# Patient Record
Sex: MALE | Race: WHITE | NOT HISPANIC OR LATINO
[De-identification: names, ages, dates, MRNs, and addresses within clinical notes are randomized per-mention and may not be internally consistent; named-entity substitution may affect disease eponyms.]

---

## 2017-01-27 PROBLEM — Z00.00 ENCOUNTER FOR PREVENTIVE HEALTH EXAMINATION: Status: ACTIVE | Noted: 2017-01-27

## 2017-01-30 ENCOUNTER — APPOINTMENT (OUTPATIENT)
Dept: ORTHOPEDIC SURGERY | Facility: CLINIC | Age: 47
End: 2017-01-30

## 2017-01-30 VITALS — BODY MASS INDEX: 34.91 KG/M2 | HEIGHT: 74 IN | WEIGHT: 272 LBS

## 2017-02-27 ENCOUNTER — APPOINTMENT (OUTPATIENT)
Dept: ORTHOPEDIC SURGERY | Facility: CLINIC | Age: 47
End: 2017-02-27

## 2017-02-27 VITALS — HEIGHT: 74 IN | WEIGHT: 272 LBS | BODY MASS INDEX: 34.91 KG/M2

## 2017-02-27 DIAGNOSIS — Z78.9 OTHER SPECIFIED HEALTH STATUS: ICD-10-CM

## 2017-02-27 DIAGNOSIS — Z80.7 FAMILY HISTORY OF OTHER MALIGNANT NEOPLASMS OF LYMPHOID, HEMATOPOIETIC AND RELATED TISSUES: ICD-10-CM

## 2017-02-27 DIAGNOSIS — Z82.61 FAMILY HISTORY OF ARTHRITIS: ICD-10-CM

## 2017-02-27 DIAGNOSIS — Z86.79 PERSONAL HISTORY OF OTHER DISEASES OF THE CIRCULATORY SYSTEM: ICD-10-CM

## 2017-06-05 ENCOUNTER — OUTPATIENT (OUTPATIENT)
Dept: OUTPATIENT SERVICES | Facility: HOSPITAL | Age: 47
LOS: 1 days | End: 2017-06-05
Payer: COMMERCIAL

## 2017-06-05 VITALS
HEIGHT: 73 IN | TEMPERATURE: 98 F | SYSTOLIC BLOOD PRESSURE: 140 MMHG | HEART RATE: 82 BPM | WEIGHT: 283.96 LBS | RESPIRATION RATE: 16 BRPM | DIASTOLIC BLOOD PRESSURE: 90 MMHG

## 2017-06-05 DIAGNOSIS — M48.07 SPINAL STENOSIS, LUMBOSACRAL REGION: ICD-10-CM

## 2017-06-05 DIAGNOSIS — R00.2 PALPITATIONS: ICD-10-CM

## 2017-06-05 DIAGNOSIS — I80.9 PHLEBITIS AND THROMBOPHLEBITIS OF UNSPECIFIED SITE: Chronic | ICD-10-CM

## 2017-06-05 DIAGNOSIS — M48.00 SPINAL STENOSIS, SITE UNSPECIFIED: ICD-10-CM

## 2017-06-05 LAB
BLD GP AB SCN SERPL QL: NEGATIVE — SIGNIFICANT CHANGE UP
BUN SERPL-MCNC: 22 MG/DL — SIGNIFICANT CHANGE UP (ref 7–23)
CALCIUM SERPL-MCNC: 9.9 MG/DL — SIGNIFICANT CHANGE UP (ref 8.4–10.5)
CHLORIDE SERPL-SCNC: 102 MMOL/L — SIGNIFICANT CHANGE UP (ref 98–107)
CO2 SERPL-SCNC: 27 MMOL/L — SIGNIFICANT CHANGE UP (ref 22–31)
CREAT SERPL-MCNC: 0.82 MG/DL — SIGNIFICANT CHANGE UP (ref 0.5–1.3)
GLUCOSE SERPL-MCNC: 92 MG/DL — SIGNIFICANT CHANGE UP (ref 70–99)
HCT VFR BLD CALC: 45.7 % — SIGNIFICANT CHANGE UP (ref 39–50)
HGB BLD-MCNC: 14.9 G/DL — SIGNIFICANT CHANGE UP (ref 13–17)
MCHC RBC-ENTMCNC: 28.2 PG — SIGNIFICANT CHANGE UP (ref 27–34)
MCHC RBC-ENTMCNC: 32.6 % — SIGNIFICANT CHANGE UP (ref 32–36)
MCV RBC AUTO: 86.4 FL — SIGNIFICANT CHANGE UP (ref 80–100)
PLATELET # BLD AUTO: 200 K/UL — SIGNIFICANT CHANGE UP (ref 150–400)
PMV BLD: 11.8 FL — SIGNIFICANT CHANGE UP (ref 7–13)
POTASSIUM SERPL-MCNC: 4.2 MMOL/L — SIGNIFICANT CHANGE UP (ref 3.5–5.3)
POTASSIUM SERPL-SCNC: 4.2 MMOL/L — SIGNIFICANT CHANGE UP (ref 3.5–5.3)
RBC # BLD: 5.29 M/UL — SIGNIFICANT CHANGE UP (ref 4.2–5.8)
RBC # FLD: 13.7 % — SIGNIFICANT CHANGE UP (ref 10.3–14.5)
RH IG SCN BLD-IMP: POSITIVE — SIGNIFICANT CHANGE UP
SODIUM SERPL-SCNC: 143 MMOL/L — SIGNIFICANT CHANGE UP (ref 135–145)
WBC # BLD: 6.29 K/UL — SIGNIFICANT CHANGE UP (ref 3.8–10.5)
WBC # FLD AUTO: 6.29 K/UL — SIGNIFICANT CHANGE UP (ref 3.8–10.5)

## 2017-06-05 PROCEDURE — 93010 ELECTROCARDIOGRAM REPORT: CPT

## 2017-06-05 RX ORDER — SODIUM CHLORIDE 9 MG/ML
1000 INJECTION, SOLUTION INTRAVENOUS
Qty: 0 | Refills: 0 | Status: DISCONTINUED | OUTPATIENT
Start: 2017-06-13 | End: 2017-06-14

## 2017-06-05 NOTE — H&P PST ADULT - PROBLEM SELECTOR PLAN 1
Pt is scheduled for L3-L5 lumbar laminectomy and fusion with instrumentation on 6/13/17.   Pre op instructions including chlorhexidine wash given and pt able to verbalize understanding. Pt is scheduled for L3-L5 lumbar laminectomy and fusion with instrumentation on 6/13/17.   Pre op instructions including chlorhexidine wash given and pt able to verbalize understanding.  Pt met STOP BANG score for JOSE precaution. OR booking notified via fax.

## 2017-06-05 NOTE — H&P PST ADULT - NEGATIVE CARDIOVASCULAR SYMPTOMS
no dyspnea on exertion/no claudication/no orthopnea/no chest pain/no peripheral edema/no palpitations/no paroxysmal nocturnal dyspnea

## 2017-06-05 NOTE — H&P PST ADULT - FAMILY HISTORY
Father  Still living? Yes, Estimated age: Age Unknown  Family history of Parkinson disease, Age at diagnosis: Age Unknown  Family history of ischemic heart disease, Age at diagnosis: Age Unknown  Family history of diabetes mellitus, Age at diagnosis: Age Unknown     Grandparent  Still living? No  Family history of ischemic heart disease, Age at diagnosis: Age Unknown

## 2017-06-05 NOTE — H&P PST ADULT - PMH
GERD (gastroesophageal reflux disease)    Hypertension    Phlebitis  right lower extremity GERD (gastroesophageal reflux disease)    Hypertension    Phlebitis  right lower extremity  Spinal stenosis GERD (gastroesophageal reflux disease)    Palpitations  dx. 2015 on metoprolol  Phlebitis  right lower extremity  Spinal stenosis

## 2017-06-05 NOTE — H&P PST ADULT - NEGATIVE OPHTHALMOLOGIC SYMPTOMS
no diplopia/no blurred vision L/no pain L/no photophobia/no lacrimation R/no lacrimation L/no pain R

## 2017-06-05 NOTE — H&P PST ADULT - RS GEN PE MLT RESP DETAILS PC
airway patent/no wheezes/good air movement/respirations non-labored/breath sounds equal/no rhonchi/no rales/no intercostal retractions/no chest wall tenderness/clear to auscultation bilaterally/no subcutaneous emphysema

## 2017-06-05 NOTE — H&P PST ADULT - NSANTHOSAYNRD_GEN_A_CORE
No. JOSE screening performed.  STOP BANG Legend: 0-2 = LOW Risk; 3-4 = INTERMEDIATE Risk; 5-8 = HIGH Risk

## 2017-06-05 NOTE — H&P PST ADULT - LYMPHATIC
anterior cervical R/posterior cervical R/posterior cervical L/supraclavicular L/supraclavicular R/anterior cervical L

## 2017-06-05 NOTE — H&P PST ADULT - HISTORY OF PRESENT ILLNESS
48 yo with PMH hypertension presents to pre surgical testing.  Pt reports he has been experiencing back pain for the past 6 years, progressively becoming worse. Last MRI done April 2017, showed spondylitises spinal stenosis.  Pt is scheduled for L3-L5 lumbar laminectomy and fusion with instrumentation on 6/13/17. 48 yo with PMH hypertension presents to pre surgical testing.  Pt reports he has been experiencing back pain for the past 6 years, progressively becoming worse. Last MRI done April 2017, showed spondylolisthises and spinal stenosis.  Pt is scheduled for L3-L5 lumbar laminectomy and fusion with instrumentation on 6/13/17.

## 2017-06-12 NOTE — ASU PATIENT PROFILE, ADULT - PMH
GERD (gastroesophageal reflux disease)    Palpitations  dx. 2015 on metoprolol  Phlebitis  right lower extremity  Spinal stenosis

## 2017-06-13 ENCOUNTER — RESULT REVIEW (OUTPATIENT)
Age: 47
End: 2017-06-13

## 2017-06-13 ENCOUNTER — INPATIENT (INPATIENT)
Facility: HOSPITAL | Age: 47
LOS: 2 days | Discharge: HOME | End: 2017-06-16
Attending: ORTHOPAEDIC SURGERY | Admitting: ORTHOPAEDIC SURGERY
Payer: COMMERCIAL

## 2017-06-13 ENCOUNTER — APPOINTMENT (OUTPATIENT)
Dept: ORTHOPEDIC SURGERY | Facility: HOSPITAL | Age: 47
End: 2017-06-13

## 2017-06-13 ENCOUNTER — TRANSCRIPTION ENCOUNTER (OUTPATIENT)
Age: 47
End: 2017-06-13

## 2017-06-13 VITALS
RESPIRATION RATE: 16 BRPM | OXYGEN SATURATION: 96 % | HEART RATE: 62 BPM | SYSTOLIC BLOOD PRESSURE: 138 MMHG | WEIGHT: 279.99 LBS | HEIGHT: 74 IN | DIASTOLIC BLOOD PRESSURE: 82 MMHG | TEMPERATURE: 98 F

## 2017-06-13 DIAGNOSIS — I80.9 PHLEBITIS AND THROMBOPHLEBITIS OF UNSPECIFIED SITE: Chronic | ICD-10-CM

## 2017-06-13 DIAGNOSIS — M48.07 SPINAL STENOSIS, LUMBOSACRAL REGION: ICD-10-CM

## 2017-06-13 LAB
BASOPHILS # BLD AUTO: 0 K/UL — SIGNIFICANT CHANGE UP (ref 0–0.2)
BASOPHILS NFR BLD AUTO: 0 % — SIGNIFICANT CHANGE UP (ref 0–2)
BASOPHILS NFR SPEC: 0 % — SIGNIFICANT CHANGE UP (ref 0–2)
BUN SERPL-MCNC: 24 MG/DL — HIGH (ref 7–23)
CALCIUM SERPL-MCNC: 8.9 MG/DL — SIGNIFICANT CHANGE UP (ref 8.4–10.5)
CHLORIDE SERPL-SCNC: 103 MMOL/L — SIGNIFICANT CHANGE UP (ref 98–107)
CO2 SERPL-SCNC: 24 MMOL/L — SIGNIFICANT CHANGE UP (ref 22–31)
CREAT SERPL-MCNC: 1.01 MG/DL — SIGNIFICANT CHANGE UP (ref 0.5–1.3)
EOSINOPHIL # BLD AUTO: 0.02 K/UL — SIGNIFICANT CHANGE UP (ref 0–0.5)
EOSINOPHIL NFR BLD AUTO: 0.2 % — SIGNIFICANT CHANGE UP (ref 0–6)
EOSINOPHIL NFR FLD: 0.9 % — SIGNIFICANT CHANGE UP (ref 0–6)
GIANT PLATELETS BLD QL SMEAR: PRESENT — SIGNIFICANT CHANGE UP
GLUCOSE SERPL-MCNC: 136 MG/DL — HIGH (ref 70–99)
HCT VFR BLD CALC: 42.1 % — SIGNIFICANT CHANGE UP (ref 39–50)
HGB BLD-MCNC: 13.5 G/DL — SIGNIFICANT CHANGE UP (ref 13–17)
IMM GRANULOCYTES NFR BLD AUTO: 0.4 % — SIGNIFICANT CHANGE UP (ref 0–1.5)
LYMPHOCYTES # BLD AUTO: 0.96 K/UL — LOW (ref 1–3.3)
LYMPHOCYTES # BLD AUTO: 9.6 % — LOW (ref 13–44)
LYMPHOCYTES NFR SPEC AUTO: 11.3 % — LOW (ref 13–44)
MCHC RBC-ENTMCNC: 28 PG — SIGNIFICANT CHANGE UP (ref 27–34)
MCHC RBC-ENTMCNC: 32.1 % — SIGNIFICANT CHANGE UP (ref 32–36)
MCV RBC AUTO: 87.2 FL — SIGNIFICANT CHANGE UP (ref 80–100)
MONOCYTES # BLD AUTO: 0.23 K/UL — SIGNIFICANT CHANGE UP (ref 0–0.9)
MONOCYTES NFR BLD AUTO: 2.3 % — SIGNIFICANT CHANGE UP (ref 2–14)
MONOCYTES NFR BLD: 0.9 % — LOW (ref 2–9)
NEUTROPHIL AB SER-ACNC: 81.7 % — HIGH (ref 43–77)
NEUTROPHILS # BLD AUTO: 8.7 K/UL — HIGH (ref 1.8–7.4)
NEUTROPHILS NFR BLD AUTO: 87.5 % — HIGH (ref 43–77)
NEUTS BAND # BLD: 5.2 % — SIGNIFICANT CHANGE UP (ref 0–6)
OVALOCYTES BLD QL SMEAR: SLIGHT — SIGNIFICANT CHANGE UP
PLATELET # BLD AUTO: 164 K/UL — SIGNIFICANT CHANGE UP (ref 150–400)
PLATELET COUNT - ESTIMATE: NORMAL — SIGNIFICANT CHANGE UP
PMV BLD: 10.6 FL — SIGNIFICANT CHANGE UP (ref 7–13)
POTASSIUM SERPL-MCNC: 4.7 MMOL/L — SIGNIFICANT CHANGE UP (ref 3.5–5.3)
POTASSIUM SERPL-SCNC: 4.7 MMOL/L — SIGNIFICANT CHANGE UP (ref 3.5–5.3)
RBC # BLD: 4.83 M/UL — SIGNIFICANT CHANGE UP (ref 4.2–5.8)
RBC # FLD: 13.8 % — SIGNIFICANT CHANGE UP (ref 10.3–14.5)
RH IG SCN BLD-IMP: POSITIVE — SIGNIFICANT CHANGE UP
SODIUM SERPL-SCNC: 142 MMOL/L — SIGNIFICANT CHANGE UP (ref 135–145)
WBC # BLD: 9.95 K/UL — SIGNIFICANT CHANGE UP (ref 3.8–10.5)
WBC # FLD AUTO: 9.95 K/UL — SIGNIFICANT CHANGE UP (ref 3.8–10.5)

## 2017-06-13 PROCEDURE — 63047 LAM FACETEC & FORAMOT LUMBAR: CPT

## 2017-06-13 PROCEDURE — 63030 LAMOT DCMPRN NRV RT 1 LMBR: CPT | Mod: 82,59

## 2017-06-13 PROCEDURE — 22840 INSERT SPINE FIXATION DEVICE: CPT

## 2017-06-13 PROCEDURE — 63047 LAM FACETEC & FORAMOT LUMBAR: CPT | Mod: 82

## 2017-06-13 PROCEDURE — 22612 ARTHRD PST TQ 1NTRSPC LUMBAR: CPT

## 2017-06-13 PROCEDURE — 63030 LAMOT DCMPRN NRV RT 1 LMBR: CPT | Mod: 59

## 2017-06-13 PROCEDURE — 22840 INSERT SPINE FIXATION DEVICE: CPT | Mod: 82

## 2017-06-13 PROCEDURE — 22612 ARTHRD PST TQ 1NTRSPC LUMBAR: CPT | Mod: 82

## 2017-06-13 PROCEDURE — 63048 LAM FACETEC &FORAMOT EA ADDL: CPT | Mod: 82

## 2017-06-13 PROCEDURE — 88311 DECALCIFY TISSUE: CPT | Mod: 26

## 2017-06-13 PROCEDURE — 72100 X-RAY EXAM L-S SPINE 2/3 VWS: CPT | Mod: 26

## 2017-06-13 PROCEDURE — 63048 LAM FACETEC &FORAMOT EA ADDL: CPT

## 2017-06-13 PROCEDURE — 88304 TISSUE EXAM BY PATHOLOGIST: CPT | Mod: 26

## 2017-06-13 RX ORDER — SODIUM CHLORIDE 9 MG/ML
1000 INJECTION INTRAMUSCULAR; INTRAVENOUS; SUBCUTANEOUS
Qty: 0 | Refills: 0 | Status: DISCONTINUED | OUTPATIENT
Start: 2017-06-13 | End: 2017-06-15

## 2017-06-13 RX ORDER — NALOXONE HYDROCHLORIDE 4 MG/.1ML
0.1 SPRAY NASAL
Qty: 0 | Refills: 0 | Status: DISCONTINUED | OUTPATIENT
Start: 2017-06-13 | End: 2017-06-14

## 2017-06-13 RX ORDER — METOPROLOL TARTRATE 50 MG
25 TABLET ORAL
Qty: 0 | Refills: 0 | Status: DISCONTINUED | OUTPATIENT
Start: 2017-06-13 | End: 2017-06-16

## 2017-06-13 RX ORDER — ONDANSETRON 8 MG/1
4 TABLET, FILM COATED ORAL EVERY 6 HOURS
Qty: 0 | Refills: 0 | Status: DISCONTINUED | OUTPATIENT
Start: 2017-06-13 | End: 2017-06-16

## 2017-06-13 RX ORDER — HYDROMORPHONE HYDROCHLORIDE 2 MG/ML
30 INJECTION INTRAMUSCULAR; INTRAVENOUS; SUBCUTANEOUS
Qty: 0 | Refills: 0 | Status: DISCONTINUED | OUTPATIENT
Start: 2017-06-13 | End: 2017-06-14

## 2017-06-13 RX ORDER — ONDANSETRON 8 MG/1
4 TABLET, FILM COATED ORAL EVERY 6 HOURS
Qty: 0 | Refills: 0 | Status: DISCONTINUED | OUTPATIENT
Start: 2017-06-13 | End: 2017-06-14

## 2017-06-13 RX ORDER — CEFAZOLIN SODIUM 1 G
1000 VIAL (EA) INJECTION EVERY 8 HOURS
Qty: 0 | Refills: 0 | Status: COMPLETED | OUTPATIENT
Start: 2017-06-13 | End: 2017-06-13

## 2017-06-13 RX ORDER — METOCLOPRAMIDE HCL 10 MG
5 TABLET ORAL ONCE
Qty: 0 | Refills: 0 | Status: COMPLETED | OUTPATIENT
Start: 2017-06-13 | End: 2017-06-13

## 2017-06-13 RX ORDER — HYDROMORPHONE HYDROCHLORIDE 2 MG/ML
0.5 INJECTION INTRAMUSCULAR; INTRAVENOUS; SUBCUTANEOUS
Qty: 0 | Refills: 0 | Status: DISCONTINUED | OUTPATIENT
Start: 2017-06-13 | End: 2017-06-14

## 2017-06-13 RX ORDER — DOCUSATE SODIUM 100 MG
100 CAPSULE ORAL THREE TIMES A DAY
Qty: 0 | Refills: 0 | Status: DISCONTINUED | OUTPATIENT
Start: 2017-06-13 | End: 2017-06-16

## 2017-06-13 RX ORDER — FAMOTIDINE 10 MG/ML
20 INJECTION INTRAVENOUS EVERY 24 HOURS
Qty: 0 | Refills: 0 | Status: DISCONTINUED | OUTPATIENT
Start: 2017-06-13 | End: 2017-06-14

## 2017-06-13 RX ADMIN — Medication 100 MILLIGRAM(S): at 15:38

## 2017-06-13 RX ADMIN — ONDANSETRON 4 MILLIGRAM(S): 8 TABLET, FILM COATED ORAL at 16:23

## 2017-06-13 RX ADMIN — HYDROMORPHONE HYDROCHLORIDE 30 MILLILITER(S): 2 INJECTION INTRAMUSCULAR; INTRAVENOUS; SUBCUTANEOUS at 12:51

## 2017-06-13 RX ADMIN — HYDROMORPHONE HYDROCHLORIDE 30 MILLILITER(S): 2 INJECTION INTRAMUSCULAR; INTRAVENOUS; SUBCUTANEOUS at 19:26

## 2017-06-13 RX ADMIN — HYDROMORPHONE HYDROCHLORIDE 30 MILLILITER(S): 2 INJECTION INTRAMUSCULAR; INTRAVENOUS; SUBCUTANEOUS at 14:46

## 2017-06-13 RX ADMIN — Medication 5 MILLIGRAM(S): at 18:39

## 2017-06-13 RX ADMIN — SODIUM CHLORIDE 100 MILLILITER(S): 9 INJECTION INTRAMUSCULAR; INTRAVENOUS; SUBCUTANEOUS at 13:47

## 2017-06-13 RX ADMIN — Medication 25 MILLIGRAM(S): at 21:07

## 2017-06-13 RX ADMIN — Medication 100 MILLIGRAM(S): at 21:07

## 2017-06-13 NOTE — BRIEF OPERATIVE NOTE - PROCEDURE
Lumbar laminectomy with discectomy with fusion  06/13/2017  L3-L5 LAMINECTOMY  L4-L5 INSTRUMENTED FUSION  L3-L4 DISCECTOMY  Active  CEDRIC

## 2017-06-13 NOTE — PROGRESS NOTE ADULT - SUBJECTIVE AND OBJECTIVE BOX
ORTHO-POST OP CHECK     WADE AMSON 47y Male is now s/p L3-L5 Laminectomy PSIF L3-L4 discectomy POD #0. Pt was seen and evaluated at bedside. Pain well controlled, pt denies any cp/sob/n/v/ha at this time.     Vital Signs Last 24 Hrs  T(C): 37, Max: 37 (06-13 @ 13:00)  T(F): 98.6, Max: 98.6 (06-13 @ 13:00)  HR: 67 (62 - 76)  BP: 135/75 (106/63 - 138/82)  BP(mean): --  RR: 16 (11 - 19)  SpO2: 96% (95% - 100%)                        13.5   9.95  )-----------( 164      ( 13 Jun 2017 12:55 )             42.1        PE:  Gen: NAD  B/L LE   Dressing C/D/I HV in place  Motor: EHL/FHL/TA/G/S intact 5/5 B/L   Sensation: Intact to light touch bilaterally   Compartments soft compressible  Distal pulses present     A/P   WADE MASON 47y Male is now s/p L3-L5 Laminectomy PSIF L3-L4 discectomy POD #0. Pain well controlled denies any cp/sob/n/v/ha at this time.  -Pain control  -DVT ppx  -Antibiotics x24hrs  -IVF  -PO Diet  -PT/OT  -WBAT, OOB  -FU am labs  -FU HV out put

## 2017-06-13 NOTE — BRIEF OPERATIVE NOTE - PRE-OP DX
Lumbar herniated disc  06/13/2017  L3-L4  Active  Rogerio Mims  Lumbar stenosis with neurogenic claudication  06/13/2017  L3-L5  Active  Rogerio Mims  Spondylolisthesis at L4-L5 level  06/13/2017    Active  Rogerio Mims

## 2017-06-14 DIAGNOSIS — Z29.9 ENCOUNTER FOR PROPHYLACTIC MEASURES, UNSPECIFIED: ICD-10-CM

## 2017-06-14 DIAGNOSIS — M48.06 SPINAL STENOSIS, LUMBAR REGION: ICD-10-CM

## 2017-06-14 DIAGNOSIS — Z01.30 ENCOUNTER FOR EXAMINATION OF BLOOD PRESSURE WITHOUT ABNORMAL FINDINGS: ICD-10-CM

## 2017-06-14 LAB
BASOPHILS # BLD AUTO: 0.01 K/UL — SIGNIFICANT CHANGE UP (ref 0–0.2)
BASOPHILS NFR BLD AUTO: 0.1 % — SIGNIFICANT CHANGE UP (ref 0–2)
BUN SERPL-MCNC: 15 MG/DL — SIGNIFICANT CHANGE UP (ref 7–23)
CALCIUM SERPL-MCNC: 8.9 MG/DL — SIGNIFICANT CHANGE UP (ref 8.4–10.5)
CHLORIDE SERPL-SCNC: 103 MMOL/L — SIGNIFICANT CHANGE UP (ref 98–107)
CO2 SERPL-SCNC: 26 MMOL/L — SIGNIFICANT CHANGE UP (ref 22–31)
CREAT SERPL-MCNC: 0.8 MG/DL — SIGNIFICANT CHANGE UP (ref 0.5–1.3)
EOSINOPHIL # BLD AUTO: 0.01 K/UL — SIGNIFICANT CHANGE UP (ref 0–0.5)
EOSINOPHIL NFR BLD AUTO: 0.1 % — SIGNIFICANT CHANGE UP (ref 0–6)
GLUCOSE SERPL-MCNC: 98 MG/DL — SIGNIFICANT CHANGE UP (ref 70–99)
HCT VFR BLD CALC: 40.8 % — SIGNIFICANT CHANGE UP (ref 39–50)
HGB BLD-MCNC: 13 G/DL — SIGNIFICANT CHANGE UP (ref 13–17)
IMM GRANULOCYTES NFR BLD AUTO: 0.3 % — SIGNIFICANT CHANGE UP (ref 0–1.5)
LYMPHOCYTES # BLD AUTO: 1.34 K/UL — SIGNIFICANT CHANGE UP (ref 1–3.3)
LYMPHOCYTES # BLD AUTO: 15.1 % — SIGNIFICANT CHANGE UP (ref 13–44)
MCHC RBC-ENTMCNC: 27.5 PG — SIGNIFICANT CHANGE UP (ref 27–34)
MCHC RBC-ENTMCNC: 31.9 % — LOW (ref 32–36)
MCV RBC AUTO: 86.4 FL — SIGNIFICANT CHANGE UP (ref 80–100)
MONOCYTES # BLD AUTO: 0.91 K/UL — HIGH (ref 0–0.9)
MONOCYTES NFR BLD AUTO: 10.2 % — SIGNIFICANT CHANGE UP (ref 2–14)
NEUTROPHILS # BLD AUTO: 6.58 K/UL — SIGNIFICANT CHANGE UP (ref 1.8–7.4)
NEUTROPHILS NFR BLD AUTO: 74.2 % — SIGNIFICANT CHANGE UP (ref 43–77)
PLATELET # BLD AUTO: 201 K/UL — SIGNIFICANT CHANGE UP (ref 150–400)
PMV BLD: 11.2 FL — SIGNIFICANT CHANGE UP (ref 7–13)
POTASSIUM SERPL-MCNC: 4.1 MMOL/L — SIGNIFICANT CHANGE UP (ref 3.5–5.3)
POTASSIUM SERPL-SCNC: 4.1 MMOL/L — SIGNIFICANT CHANGE UP (ref 3.5–5.3)
RBC # BLD: 4.72 M/UL — SIGNIFICANT CHANGE UP (ref 4.2–5.8)
RBC # FLD: 13.6 % — SIGNIFICANT CHANGE UP (ref 10.3–14.5)
SODIUM SERPL-SCNC: 141 MMOL/L — SIGNIFICANT CHANGE UP (ref 135–145)
WBC # BLD: 8.88 K/UL — SIGNIFICANT CHANGE UP (ref 3.8–10.5)
WBC # FLD AUTO: 8.88 K/UL — SIGNIFICANT CHANGE UP (ref 3.8–10.5)

## 2017-06-14 PROCEDURE — 99223 1ST HOSP IP/OBS HIGH 75: CPT

## 2017-06-14 RX ORDER — POLYETHYLENE GLYCOL 3350 17 G/17G
17 POWDER, FOR SOLUTION ORAL DAILY
Qty: 0 | Refills: 0 | Status: DISCONTINUED | OUTPATIENT
Start: 2017-06-14 | End: 2017-06-16

## 2017-06-14 RX ORDER — FAMOTIDINE 10 MG/ML
20 INJECTION INTRAVENOUS
Qty: 0 | Refills: 0 | Status: DISCONTINUED | OUTPATIENT
Start: 2017-06-14 | End: 2017-06-16

## 2017-06-14 RX ORDER — BENZOCAINE AND MENTHOL 5; 1 G/100ML; G/100ML
1 LIQUID ORAL EVERY 8 HOURS
Qty: 0 | Refills: 0 | Status: DISCONTINUED | OUTPATIENT
Start: 2017-06-14 | End: 2017-06-16

## 2017-06-14 RX ORDER — HYDROMORPHONE HYDROCHLORIDE 2 MG/ML
1 INJECTION INTRAMUSCULAR; INTRAVENOUS; SUBCUTANEOUS EVERY 8 HOURS
Qty: 0 | Refills: 0 | Status: DISCONTINUED | OUTPATIENT
Start: 2017-06-14 | End: 2017-06-14

## 2017-06-14 RX ORDER — TIZANIDINE 4 MG/1
2 TABLET ORAL EVERY 6 HOURS
Qty: 0 | Refills: 0 | Status: DISCONTINUED | OUTPATIENT
Start: 2017-06-14 | End: 2017-06-16

## 2017-06-14 RX ORDER — HYDROMORPHONE HYDROCHLORIDE 2 MG/ML
1 INJECTION INTRAMUSCULAR; INTRAVENOUS; SUBCUTANEOUS EVERY 6 HOURS
Qty: 0 | Refills: 0 | Status: DISCONTINUED | OUTPATIENT
Start: 2017-06-14 | End: 2017-06-14

## 2017-06-14 RX ORDER — ACETAMINOPHEN 500 MG
650 TABLET ORAL EVERY 6 HOURS
Qty: 0 | Refills: 0 | Status: DISCONTINUED | OUTPATIENT
Start: 2017-06-14 | End: 2017-06-16

## 2017-06-14 RX ORDER — OXYCODONE HYDROCHLORIDE 5 MG/1
10 TABLET ORAL
Qty: 0 | Refills: 0 | Status: DISCONTINUED | OUTPATIENT
Start: 2017-06-14 | End: 2017-06-16

## 2017-06-14 RX ORDER — SENNA PLUS 8.6 MG/1
2 TABLET ORAL AT BEDTIME
Qty: 0 | Refills: 0 | Status: DISCONTINUED | OUTPATIENT
Start: 2017-06-14 | End: 2017-06-16

## 2017-06-14 RX ORDER — BENZOCAINE AND MENTHOL 5; 1 G/100ML; G/100ML
1 LIQUID ORAL THREE TIMES A DAY
Qty: 0 | Refills: 0 | Status: DISCONTINUED | OUTPATIENT
Start: 2017-06-14 | End: 2017-06-14

## 2017-06-14 RX ORDER — BENZOCAINE AND MENTHOL 5; 1 G/100ML; G/100ML
1 LIQUID ORAL
Qty: 0 | Refills: 0 | Status: DISCONTINUED | OUTPATIENT
Start: 2017-06-14 | End: 2017-06-14

## 2017-06-14 RX ADMIN — Medication 100 MILLIGRAM(S): at 13:12

## 2017-06-14 RX ADMIN — Medication 650 MILLIGRAM(S): at 13:12

## 2017-06-14 RX ADMIN — TIZANIDINE 2 MILLIGRAM(S): 4 TABLET ORAL at 19:01

## 2017-06-14 RX ADMIN — Medication 100 MILLIGRAM(S): at 06:06

## 2017-06-14 RX ADMIN — BENZOCAINE AND MENTHOL 1 LOZENGE: 5; 1 LIQUID ORAL at 08:43

## 2017-06-14 RX ADMIN — POLYETHYLENE GLYCOL 3350 17 GRAM(S): 17 POWDER, FOR SOLUTION ORAL at 13:12

## 2017-06-14 RX ADMIN — Medication 650 MILLIGRAM(S): at 18:02

## 2017-06-14 RX ADMIN — HYDROMORPHONE HYDROCHLORIDE 30 MILLILITER(S): 2 INJECTION INTRAMUSCULAR; INTRAVENOUS; SUBCUTANEOUS at 08:28

## 2017-06-14 RX ADMIN — Medication 100 MILLIGRAM(S): at 21:16

## 2017-06-14 RX ADMIN — FAMOTIDINE 20 MILLIGRAM(S): 10 INJECTION INTRAVENOUS at 19:01

## 2017-06-14 RX ADMIN — SENNA PLUS 2 TABLET(S): 8.6 TABLET ORAL at 21:16

## 2017-06-14 RX ADMIN — HYDROMORPHONE HYDROCHLORIDE 30 MILLILITER(S): 2 INJECTION INTRAMUSCULAR; INTRAVENOUS; SUBCUTANEOUS at 07:04

## 2017-06-14 RX ADMIN — Medication 25 MILLIGRAM(S): at 19:01

## 2017-06-14 RX ADMIN — Medication 25 MILLIGRAM(S): at 06:06

## 2017-06-14 NOTE — PROGRESS NOTE ADULT - SUBJECTIVE AND OBJECTIVE BOX
Pain is currently well controlled with pain medication. No current chest pain, shortness of breath, lightheadedness or dizziness. No nausea or vomiting. Will walk with PT today    Vitals: ICU Vital Signs Last 24 Hrs  T(C): 36.7, Max: 37 (06-13 @ 13:00)  T(F): 98, Max: 98.6 (06-13 @ 13:00)  HR: 63 (63 - 76)  BP: 124/66 (106/63 - 138/77)  BP(mean): --  ABP: --  ABP(mean): --  RR: 18 (11 - 19)  SpO2: 97% (93% - 100%)                          13.0   8.88  )-----------( 201      ( 14 Jun 2017 05:30 )             40.8       Exam:  Gen: NAD  Motor: 5/5 EHL/FHL/TA/Gastrocnemius  Sensory: SILT DP/SP/S/S/T nerve distributions  Vascular: 2+ Dorsalis Pedis pulse  Dressing: Clean, Dry, Intact  HV serosang    A/P: 47 year old M s/p L3-L5 lami/PSF, L3-L4 discectomy  - Pain Control  - Regular Diet  - Venodynes  - TLSO for comfort  - PT/OT, OOB  - WBAT  - HV drain  -cepacol lozenge prn

## 2017-06-14 NOTE — CONSULT NOTE ADULT - ASSESSMENT
47M h/o lumbar stenosis with neurogenic claudication, L3-4 herniated disc, and L4-5 spondylolisthesis s/p Lumbar laminectomy with discectomy and fusion, POD #1.

## 2017-06-14 NOTE — PROGRESS NOTE ADULT - SUBJECTIVE AND OBJECTIVE BOX
Anesthesia Pain Management Service- Attending Addendum    SUBJECTIVE: Pt doing well with IV PCA without problems reported.    Therapy:	  [ X] IV PCA	   [ ] Epidural           [ ] s/p Spinal Opoid              [ ] Postpartum infusion	  [ ] Patient controlled regional anesthesia (PCRA)    [ ] prn Analgesics    Allergies    No Known Allergies    Intolerances      MEDICATIONS  (STANDING):  HYDROmorphone PCA (1 mG/mL) 30milliLiter(s) PCA Continuous PCA Continuous  sodium chloride 0.9%. 1000milliLiter(s) IV Continuous <Continuous>  docusate sodium 100milliGRAM(s) Oral three times a day  metoprolol 25milliGRAM(s) Oral two times a day  famotidine    Tablet 20milliGRAM(s) Oral two times a day  polyethylene glycol 3350 17Gram(s) Oral daily  senna 2Tablet(s) Oral at bedtime  acetaminophen   Tablet. 650milliGRAM(s) Oral every 6 hours    MEDICATIONS  (PRN):  naloxone Injectable 0.1milliGRAM(s) IV Push every 3 minutes PRN For ANY of the following changes in patient status:  A. RR LESS THAN 10 breaths per minute, B. Oxygen saturation LESS THAN 90%, C. Sedation score of 6  ondansetron Injectable 4milliGRAM(s) IV Push every 6 hours PRN Nausea  HYDROmorphone PCA (1 mG/mL) Rescue Clinician Bolus 0.5milliGRAM(s) IV Push every 15 minutes PRN for Pain Scale GREATER THAN 6  benzocaine 15 mG/menthol 3.6 mG Lozenge 1Lozenge Oral every 8 hours PRN Sore Throat      OBJECTIVE:   [X] No new signs     [ ] Other:    Side Effects:  [X ] None			[ ] Other:    Assessment of Catheter Site:		[ ] Intact		[ ] Other:    ASSESSMENT/PLAN  [ X] Continue current therapy    [ ] Therapy changed to:    [ ] IV PCA       [ ] Epidural     [ ] prn Analgesics     Comments:

## 2017-06-14 NOTE — OCCUPATIONAL THERAPY INITIAL EVALUATION ADULT - MD ORDER
Occupational Therapy to evaluate and treat. Occupational Therapy to evaluate and treat. OOB to Chair. Per FLAQUITA Pratt, pt is okay to participate in OT evaluation and perform activity as tolerated.

## 2017-06-14 NOTE — OCCUPATIONAL THERAPY INITIAL EVALUATION ADULT - NS ASR BATHING EQUIP NEEDS
grab bar/Pt. to be educated on benefits and how to privately purchase during upcoming ADL session./shower chair

## 2017-06-14 NOTE — PROGRESS NOTE ADULT - SUBJECTIVE AND OBJECTIVE BOX
Day _2_ of Anesthesia Pain Management Service    Allergies    No Known Allergies    Intolerances    SUBJECTIVE: "It hurts most when I'm sitting and lying down, standing helps. The pump doesn't seem to do anything."    Pain Scale Score	At rest: _5/10_	With Activity: ___ 	[ ] Refer to charted pain scores    THERAPY:    [ ] IV PCA Morphine		[ ] 5 mg/mL	[ ] 1 mg/mL  [X] IV PCA Hydromorphone	[ ] 5 mg/mL	[X] 1 mg/mL  [ ] IV PCA Fentanyl		[ ] 50 micrograms/mL    Demand dose _0.2mg_ lockout _6_ (minutes) Continuous Rate _0_ Total: _5.7mg_  Daily      MEDICATIONS  (STANDING):  HYDROmorphone PCA (1 mG/mL) 30milliLiter(s) PCA Continuous PCA Continuous  sodium chloride 0.9%. 1000milliLiter(s) IV Continuous <Continuous>  docusate sodium 100milliGRAM(s) Oral three times a day  metoprolol 25milliGRAM(s) Oral two times a day  famotidine    Tablet 20milliGRAM(s) Oral two times a day  polyethylene glycol 3350 17Gram(s) Oral daily  senna 2Tablet(s) Oral at bedtime  acetaminophen   Tablet. 650milliGRAM(s) Oral every 6 hours    MEDICATIONS  (PRN):  naloxone Injectable 0.1milliGRAM(s) IV Push every 3 minutes PRN For ANY of the following changes in patient status:  A. RR LESS THAN 10 breaths per minute, B. Oxygen saturation LESS THAN 90%, C. Sedation score of 6  ondansetron Injectable 4milliGRAM(s) IV Push every 6 hours PRN Nausea  HYDROmorphone PCA (1 mG/mL) Rescue Clinician Bolus 0.5milliGRAM(s) IV Push every 15 minutes PRN for Pain Scale GREATER THAN 6  benzocaine 15 mG/menthol 3.6 mG Lozenge 1Lozenge Oral every 8 hours PRN Sore Throat      OBJECTIVE: A&Ox3, NAD, sitting up in chair    Sedation Score:	[X] Alert	[ ] Drowsy	[ ] Arousable	[ ] Asleep	[ ] Unresponsive    Side Effects:	[X] None	[ ] Nausea	[ ] Vomiting	[ ] Pruritus  		  [ ] Weakness		[ ] Numbness	[ ] Other:                              13.0   8.88  )-----------( 201      ( 14 Jun 2017 05:30 )             40.8       06-14    141  |  103  |  15  ----------------------------<  98  4.1   |  26  |  0.80    Ca    8.9      14 Jun 2017 05:30        ASSESSMENT/ PLAN    Therapy to  be:	[ ] Continue   [X] Discontinued   [X] Change to prn Analgesics    Documentation and Verification of current medications:  [X] Done	[ ] Not done, not eligible  [ ] Not done, reason not given    Comments:  Add Tylenol 650mg PO q6hrs x 2days  Zanaflex 2mg PO q6hrs x2days, PRN thereafter - for spasms  Oxycodone IR PRN analgesia

## 2017-06-14 NOTE — OCCUPATIONAL THERAPY INITIAL EVALUATION ADULT - PHYSICAL ASSIST/NONPHYSICAL ASSIST: SIT/SUPINE, REHAB EVAL
set-up required/1 person assist/verbal cues/using the log-rolling technique to adhere to spinal precautions

## 2017-06-14 NOTE — PROGRESS NOTE ADULT - SUBJECTIVE AND OBJECTIVE BOX
ANESTHESIA POSTOP CHECK    47y Male POSTOP DAY 1 S/P lumbar lami and fusion    Vital Signs Last 24 Hrs  T(C): 36.7, Max: 36.8 (06-13 @ 17:28)  T(F): 98.1, Max: 98.2 (06-13 @ 17:28)  HR: 66 (63 - 70)  BP: 124/60 (117/71 - 137/74)  BP(mean): --  RR: 17 (17 - 18)  SpO2: 99% (93% - 100%)  I&O's Summary  I & Os for 24h ending 14 Jun 2017 07:00  =============================================  IN: 200 ml / OUT: 4115 ml / NET: -3915 ml    I & Os for current day (as of 14 Jun 2017 14:26)  =============================================  IN: 0 ml / OUT: 200 ml / NET: -200 ml      [X ] NO APPARENT ANESTHESIA COMPLICATIONS      Comments:

## 2017-06-14 NOTE — OCCUPATIONAL THERAPY INITIAL EVALUATION ADULT - PRECAUTIONS/LIMITATIONS, REHAB EVAL
Thoracic Lumbar Sacral Brace prn for incisional pain/fall precautions/surgical precautions/spinal precautions

## 2017-06-14 NOTE — PHYSICAL THERAPY INITIAL EVALUATION ADULT - ADDITIONAL COMMENTS
Patient lives in a house with wife with HENRI and within. Patient did not use an AD prior to admission

## 2017-06-14 NOTE — CONSULT NOTE ADULT - SUBJECTIVE AND OBJECTIVE BOX
Chief complaint: lower back pain      HPI:  47M h/o chronic back pain x 6 years secondary to spondylolisthesis and spinal stenosis seen on MRI; admitted for lumbar laminectomy and fusion on 6/13, now POD #1.     Pain Symptoms if applicable:             	                         none	   mild         moderate         severe  Pain:	                            0	    1-3	     4-6	         7-10  Location:	  Modifying factors:	  Associated symptoms:	    Function: [x] Independent  [ ] Assistance  [ ] Total care  [ ] Non-ambulatory    Allergies: No Known Allergies    HOME MEDICATIONS: [x] Reviewed    MEDICATIONS  (STANDING):  HYDROmorphone PCA (1 mG/mL) 30milliLiter(s) PCA Continuous PCA Continuous  docusate sodium 100milliGRAM(s) Oral three times a day  metoprolol 25milliGRAM(s) Oral two times a day  famotidine    Tablet 20milliGRAM(s) Oral two times a day  polyethylene glycol 3350 17Gram(s) Oral daily  senna 2Tablet(s) Oral at bedtime  acetaminophen   Tablet. 650milliGRAM(s) Oral every 6 hours    MEDICATIONS  (PRN):  naloxone Injectable 0.1milliGRAM(s) IV Push every 3 minutes PRN For ANY of the following changes in patient status:  A. RR LESS THAN 10 breaths per minute, B. Oxygen saturation LESS THAN 90%, C. Sedation score of 6  ondansetron Injectable 4milliGRAM(s) IV Push every 6 hours PRN Nausea  HYDROmorphone PCA (1 mG/mL) Rescue Clinician Bolus 0.5milliGRAM(s) IV Push every 15 minutes PRN for Pain Scale GREATER THAN 6  benzocaine 15 mG/menthol 3.6 mG Lozenge 1Lozenge Oral every 8 hours PRN Sore Throat      PAST MEDICAL & SURGICAL HISTORY:  Palpitations: dx. 2015 on metoprolol  Spinal stenosis  Phlebitis: right lower extremity 1981  GERD    Hypertension    SOCIAL HISTORY:  Residence: [ ] [facility]  [ ] SNF  [x] Community  [ ] Substance abuse:   [ ] Tobacco:   [ ] Alcohol use:     FAMILY HISTORY:  Family history of diabetes mellitus (Father)  Family history of ischemic heart disease (Father, Grandparent)  Family history of Parkinson disease (Father)    REVIEW OF SYSTEMS:  CONSTITUTIONAL: No fever, weight loss, or fatigue  EYES: No eye pain, visual disturbances, or discharge  ENMT:  No difficulty hearing, tinnitus, vertigo; No sinus or throat pain  NECK: No pain or stiffness  RESPIRATORY: No cough, wheezing, chills or hemoptysis; No shortness of breath  CARDIOVASCULAR: No chest pain, palpitations, dizziness, or leg swelling  GASTROINTESTINAL: No abdominal or epigastric pain. No nausea, vomiting, or hematemesis; No diarrhea or constipation. No melena or hematochezia.  GENITOURINARY: No dysuria, frequency, hematuria, or incontinence  NEUROLOGICAL: No headaches, memory loss, loss of strength, numbness, or tremors  SKIN: No itching, burning, rashes, or lesions   LYMPH NODES: No enlarged glands  ENDOCRINE: No heat or cold intolerance; No hair loss  MUSCULOSKELETAL: No muscle or back pain  PSYCHIATRIC: No depression, anxiety, mood swings, or difficulty sleeping  HEME/LYMPH: No easy bruising, or bleeding gums  ALLERGY AND IMMUNOLOGIC: No hives or eczema      Vital Signs Last 24 Hrs:  T(C): 36.7, Max: 37  HR: 66 (63 - 76)  BP: 124/60 (106/63 - 138/77)  RR: 17  SpO2: 99% on RA    PHYSICAL EXAM:  GENERAL: NAD, well-groomed, well-developed  HEAD:  Atraumatic, Normocephalic  EYES: EOMI, PERRLA, conjunctiva and sclera clear  ENMT: Moist mucous membranes  NECK: Supple, No JVD  RESPIRATORY: Clear to auscultation bilaterally; No rales, rhonchi, wheezing, or rubs  CARDIOVASCULAR: Regular rate and rhythm; No murmurs, rubs, or gallops  GASTROINTESTINAL: Soft, Nontender, Nondistended; Bowel sounds present  GENITOURINARY: Not examined  EXTREMITIES:  2+ Peripheral Pulses, No clubbing, cyanosis, or edema  NERVOUS SYSTEM:  Alert & Oriented X3; Moving all 4 extremities; No gross sensory deficits  HEME/LYMPH: No lymphadenopathy noted  SKIN: No rashes or lesions; Incisions C/D/I    LABS:                        13.0   8.88  )-----------( 201                40.8         141  |  103  |  15  ----------------------------<  98  4.1   |  26  |  0.80    Ca    8.9               RADIOLOGY & ADDITIONAL STUDIES:    EKG:   Personally Reviewed:  [x ] YES     Imaging:   Personally Reviewed:  [ ] YES               Care Discussed with: Orthopedics Chief complaint: lower back pain      HPI:  47M h/o chronic back pain x 6 years secondary to spondylolisthesis and spinal stenosis seen on MRI; admitted for lumbar laminectomy and fusion on 6/13, now POD #1. Pt feels well; pain in lower back is controlled on dilaudid PCA; denies n/n, dizziness, numbness, tingling, leg pain, CP, dyspnea, cough; voiding after Mcdonald cath removed.              	                            Pain:	                            mild  Location:	                lower back  Modifying factors:	    rest  Associated symptoms:	    none    Function: [x] Independent  [ ] Assistance  [ ] Total care  [ ] Non-ambulatory    Allergies: No Known Allergies    HOME MEDICATIONS: [x] Reviewed    MEDICATIONS  (STANDING):  HYDROmorphone PCA (1 mG/mL) 30milliLiter(s) PCA Continuous PCA Continuous  docusate sodium 100milliGRAM(s) Oral three times a day  metoprolol 25milliGRAM(s) Oral two times a day  famotidine    Tablet 20milliGRAM(s) Oral two times a day  polyethylene glycol 3350 17Gram(s) Oral daily  senna 2Tablet(s) Oral at bedtime  acetaminophen   Tablet. 650milliGRAM(s) Oral every 6 hours    MEDICATIONS  (PRN):  naloxone Injectable 0.1milliGRAM(s) IV Push every 3 minutes PRN For ANY of the following changes in patient status:  A. RR LESS THAN 10 breaths per minute, B. Oxygen saturation LESS THAN 90%, C. Sedation score of 6  ondansetron Injectable 4milliGRAM(s) IV Push every 6 hours PRN Nausea  HYDROmorphone PCA (1 mG/mL) Rescue Clinician Bolus 0.5milliGRAM(s) IV Push every 15 minutes PRN for Pain Scale GREATER THAN 6  benzocaine 15 mG/menthol 3.6 mG Lozenge 1Lozenge Oral every 8 hours PRN Sore Throat      PAST MEDICAL & SURGICAL HISTORY:  Palpitations: dx. 2015 on metoprolol  Spinal stenosis  Phlebitis: right lower extremity 1981  GERD    Hypertension    SOCIAL HISTORY:  Residence: [ ] [facility]  [ ] SNF  [x] Community  [ ] Substance abuse:   [ ] Tobacco:   [ ] Alcohol use:     FAMILY HISTORY:  Family history of diabetes mellitus (Father)  Family history of ischemic heart disease (Father, Grandparent)  Family history of Parkinson disease (Father)    REVIEW OF SYSTEMS:  CONSTITUTIONAL: No fever, weight loss, or fatigue  EYES: No eye pain, visual disturbances, or discharge  ENMT:  No difficulty hearing, tinnitus, vertigo; No sinus or throat pain  NECK: No pain or stiffness  RESPIRATORY: No cough, wheezing, chills or hemoptysis; No shortness of breath  CARDIOVASCULAR: No chest pain, palpitations, dizziness, or leg swelling  GASTROINTESTINAL: No abdominal or epigastric pain. No nausea, vomiting, or hematemesis; No diarrhea or constipation. No melena or hematochezia.  GENITOURINARY: No dysuria, frequency, hematuria, or incontinence  NEUROLOGICAL: No headaches, memory loss, loss of strength, numbness, or tremors  SKIN: No itching, burning, rashes, or lesions   LYMPH NODES: No enlarged glands  ENDOCRINE: No heat or cold intolerance; No hair loss  MUSCULOSKELETAL: No muscle or back pain  PSYCHIATRIC: No depression, anxiety, mood swings, or difficulty sleeping  HEME/LYMPH: No easy bruising, or bleeding gums  ALLERGY AND IMMUNOLOGIC: No hives or eczema      Vital Signs Last 24 Hrs:  T(C): 36.7, Max: 37  HR: 66 (63 - 76)  BP: 124/60 (106/63 - 138/77)  RR: 17  SpO2: 99% on RA    PHYSICAL EXAM:  GENERAL: NAD, well-groomed, well-developed  HEAD:  Atraumatic, Normocephalic  EYES: EOMI, PERRLA, conjunctiva and sclera clear  ENMT: Moist mucous membranes  NECK: Supple, No JVD  RESPIRATORY: Clear to auscultation bilaterally; No rales, rhonchi, wheezing, or rubs  CARDIOVASCULAR: Regular rate and rhythm; No murmurs, rubs, or gallops  GASTROINTESTINAL: Soft, Nontender, Nondistended; Bowel sounds present  GENITOURINARY: Not examined  EXTREMITIES:  2+ Peripheral Pulses, No clubbing, cyanosis, or edema  NERVOUS SYSTEM:  Alert & Oriented X3; Moving all 4 extremities; No gross sensory deficits  HEME/LYMPH: No lymphadenopathy noted  SKIN: No rashes or lesions; Incisions C/D/I    LABS:                        13.0   8.88  )-----------( 201                40.8         141  |  103  |  15  ----------------------------<  98  4.1   |  26  |  0.80    Ca    8.9               RADIOLOGY & ADDITIONAL STUDIES:    EKG:   Personally Reviewed:  [x ] YES     Imaging:   Personally Reviewed:  [ ] YES               Care Discussed with: Orthopedics Chief complaint: lower back pain      HPI:  47M h/o chronic back pain x 6 years secondary to spondylolisthesis and spinal stenosis seen on MRI; admitted for lumbar laminectomy and fusion on 6/13, now POD #1. Pt feels well; pain in lower back is controlled on dilaudid PCA; denies n/n, dizziness, numbness, tingling, leg pain, CP, dyspnea, cough; voiding after Mcdonald cath removed.              	                            Pain:	                            mild  Location:	                lower back  Modifying factors:	    rest  Associated symptoms:	    none    Function: [x] Independent  [ ] Assistance  [ ] Total care  [ ] Non-ambulatory    Allergies: No Known Allergies    HOME MEDICATIONS: [x] Reviewed    MEDICATIONS  (STANDING):  HYDROmorphone PCA (1 mG/mL) 30milliLiter(s) PCA Continuous PCA Continuous  docusate sodium 100milliGRAM(s) Oral three times a day  metoprolol 25milliGRAM(s) Oral two times a day  famotidine    Tablet 20milliGRAM(s) Oral two times a day  polyethylene glycol 3350 17Gram(s) Oral daily  senna 2Tablet(s) Oral at bedtime  acetaminophen   Tablet. 650milliGRAM(s) Oral every 6 hours    MEDICATIONS  (PRN):  naloxone Injectable 0.1milliGRAM(s) IV Push every 3 minutes PRN For ANY of the following changes in patient status:  A. RR LESS THAN 10 breaths per minute, B. Oxygen saturation LESS THAN 90%, C. Sedation score of 6  ondansetron Injectable 4milliGRAM(s) IV Push every 6 hours PRN Nausea  HYDROmorphone PCA (1 mG/mL) Rescue Clinician Bolus 0.5milliGRAM(s) IV Push every 15 minutes PRN for Pain Scale GREATER THAN 6  benzocaine 15 mG/menthol 3.6 mG Lozenge 1Lozenge Oral every 8 hours PRN Sore Throat      PAST MEDICAL & SURGICAL HISTORY:  Palpitations: dx. 2015 on metoprolol  Spinal stenosis  Phlebitis: right lower extremity 1981  GERD    Hypertension    SOCIAL HISTORY:  Residence: [ ] [facility]  [ ] SNF  [x] Community  [x ] Substance abuse: no  [x ] Tobacco: former  [x ] Alcohol use: social    FAMILY HISTORY:  Family history of diabetes mellitus (Father)  Family history of ischemic heart disease (Father, Grandparent)  Family history of Parkinson disease (Father)    REVIEW OF SYSTEMS:  CONSTITUTIONAL: No fever, weight loss, or fatigue  EYES: No eye pain, visual disturbances, or discharge  ENMT:  No difficulty hearing, tinnitus, vertigo; No sinus or throat pain  NECK: No pain or stiffness  RESPIRATORY: No cough, wheezing, chills or hemoptysis; No shortness of breath  CARDIOVASCULAR: No chest pain, palpitations, dizziness, or leg swelling  GASTROINTESTINAL: No abdominal or epigastric pain. No nausea, vomiting, or hematemesis; No diarrhea or constipation. No melena or hematochezia.  GENITOURINARY: No dysuria, frequency, hematuria, or incontinence  NEUROLOGICAL: No headaches, memory loss, loss of strength, numbness, or tremors  SKIN: No itching, burning, rashes, or lesions   LYMPH NODES: No enlarged glands  ENDOCRINE: No heat or cold intolerance; No hair loss  MUSCULOSKELETAL: No muscle or back pain  PSYCHIATRIC: No depression, anxiety, mood swings, or difficulty sleeping  HEME/LYMPH: No easy bruising, or bleeding gums  ALLERGY AND IMMUNOLOGIC: No hives or eczema      Vital Signs Last 24 Hrs:  T(C): 36.7, Max: 37  HR: 66 (63 - 76)  BP: 124/60 (106/63 - 138/77)  RR: 17  SpO2: 99% on RA    PHYSICAL EXAM:  GENERAL: NAD, well-groomed, well-developed  HEAD:  Atraumatic, Normocephalic  EYES: EOMI, PERRLA, conjunctiva and sclera clear  ENMT: Moist mucous membranes  NECK: Supple, No JVD  RESPIRATORY: Clear to auscultation bilaterally; No rales, rhonchi, wheezing, or rubs  CARDIOVASCULAR: Regular rate and rhythm; No murmurs, rubs, or gallops  GASTROINTESTINAL: Soft, Nontender, Nondistended; Bowel sounds present  GENITOURINARY: Not examined  EXTREMITIES:  2+ Peripheral Pulses, No clubbing, cyanosis, or edema  NERVOUS SYSTEM:  Alert & Oriented X3; Moving all 4 extremities; No gross sensory deficits  HEME/LYMPH: No lymphadenopathy noted  SKIN: No rashes or lesions; Incisions C/D/I    LABS:                        13.0   8.88  )-----------( 201                40.8         141  |  103  |  15  ----------------------------<  98  4.1   |  26  |  0.80    Ca    8.9               RADIOLOGY & ADDITIONAL STUDIES:    EKG:   Personally Reviewed:  [x ] YES     Imaging:   Personally Reviewed:  [ ] YES               Care Discussed with: Orthopedics

## 2017-06-14 NOTE — OCCUPATIONAL THERAPY INITIAL EVALUATION ADULT - PHYSICAL ASSIST/NONPHYSICAL ASSIST, REHAB EVAL
set-up required/using the log-rolling technique to adhere to spinal precautions/1 person assist/verbal cues

## 2017-06-14 NOTE — PROGRESS NOTE ADULT - SUBJECTIVE AND OBJECTIVE BOX
ORTHO/SPINE PA NOTE    WADE MASON 47yMale  was seen, by me and Dr. Bingham at 8 AM today, laying in bed comfortable w/o complaints.  Resolved legs pains from pre-op.  Overall doing well.  Denies any CP, SOB, HA's.    Spine-  dressing clean/dry/intact; Hemovac sewn and draining/serosanguinous  RLE- motor 5/5 quad, TA, EHL, GS  LLE- motor 5/5 quad, TA, EHL, GS          NVI distally and symmetrical    A/P-47y Male POD#   s/p lumbar laminectomy and instrumented fusion  -Analgesia  -PT/OT/OOB ambulate  -cont IS  -cont DVT PPX  -check labs  -F/u internal med recs  -D/C planning  -D/w Dr. Otilia DROANTES, PA

## 2017-06-14 NOTE — OCCUPATIONAL THERAPY INITIAL EVALUATION ADULT - NS ASR DRESSING EQUIP NEEDS
Pt. to be educated on benefits and how to privately purchase during upcoming ADL session./sock aide/reacher/dressing stick/long handled shoe horn

## 2017-06-14 NOTE — OCCUPATIONAL THERAPY INITIAL EVALUATION ADULT - GENERAL OBSERVATIONS, REHAB EVAL
Pt. received sitting in chair next to bed. NAD. +C/D/I Dressing to Back, +Accordion Drain, +IV, +PCA pump, +pulse ox to R finger. Wife present bedside. PT present bedside to perform PT evaluation in conjunction w/ OT evaluation.

## 2017-06-14 NOTE — OCCUPATIONAL THERAPY INITIAL EVALUATION ADULT - DIAGNOSIS, OT EVAL
s/p L3-L5 lumbar laminectomy and fusion with instrumentation; Decreased functional mobility; Decreased ADL management

## 2017-06-14 NOTE — OCCUPATIONAL THERAPY INITIAL EVALUATION ADULT - LIVES WITH, PROFILE
Pt. reports he lives with his wife in a house with 2 steps to enter. Once inside, pt. reports he has steps to negotiate to reach 2nd floor where main bedroom and bathroom are located. Per pt., he has a bathtub in his bathroom.

## 2017-06-15 LAB
BASOPHILS # BLD AUTO: 0.01 K/UL — SIGNIFICANT CHANGE UP (ref 0–0.2)
BASOPHILS NFR BLD AUTO: 0.1 % — SIGNIFICANT CHANGE UP (ref 0–2)
BUN SERPL-MCNC: 14 MG/DL — SIGNIFICANT CHANGE UP (ref 7–23)
CALCIUM SERPL-MCNC: 9 MG/DL — SIGNIFICANT CHANGE UP (ref 8.4–10.5)
CHLORIDE SERPL-SCNC: 104 MMOL/L — SIGNIFICANT CHANGE UP (ref 98–107)
CO2 SERPL-SCNC: 27 MMOL/L — SIGNIFICANT CHANGE UP (ref 22–31)
CREAT SERPL-MCNC: 0.87 MG/DL — SIGNIFICANT CHANGE UP (ref 0.5–1.3)
EOSINOPHIL # BLD AUTO: 0.12 K/UL — SIGNIFICANT CHANGE UP (ref 0–0.5)
EOSINOPHIL NFR BLD AUTO: 1.7 % — SIGNIFICANT CHANGE UP (ref 0–6)
GLUCOSE SERPL-MCNC: 101 MG/DL — HIGH (ref 70–99)
HCT VFR BLD CALC: 37.8 % — LOW (ref 39–50)
HGB BLD-MCNC: 12.2 G/DL — LOW (ref 13–17)
IMM GRANULOCYTES NFR BLD AUTO: 0.3 % — SIGNIFICANT CHANGE UP (ref 0–1.5)
LYMPHOCYTES # BLD AUTO: 1.71 K/UL — SIGNIFICANT CHANGE UP (ref 1–3.3)
LYMPHOCYTES # BLD AUTO: 23.8 % — SIGNIFICANT CHANGE UP (ref 13–44)
MCHC RBC-ENTMCNC: 28 PG — SIGNIFICANT CHANGE UP (ref 27–34)
MCHC RBC-ENTMCNC: 32.3 % — SIGNIFICANT CHANGE UP (ref 32–36)
MCV RBC AUTO: 86.9 FL — SIGNIFICANT CHANGE UP (ref 80–100)
MONOCYTES # BLD AUTO: 0.67 K/UL — SIGNIFICANT CHANGE UP (ref 0–0.9)
MONOCYTES NFR BLD AUTO: 9.3 % — SIGNIFICANT CHANGE UP (ref 2–14)
NEUTROPHILS # BLD AUTO: 4.67 K/UL — SIGNIFICANT CHANGE UP (ref 1.8–7.4)
NEUTROPHILS NFR BLD AUTO: 64.8 % — SIGNIFICANT CHANGE UP (ref 43–77)
PLATELET # BLD AUTO: 170 K/UL — SIGNIFICANT CHANGE UP (ref 150–400)
PMV BLD: 11.4 FL — SIGNIFICANT CHANGE UP (ref 7–13)
POTASSIUM SERPL-MCNC: 4.6 MMOL/L — SIGNIFICANT CHANGE UP (ref 3.5–5.3)
POTASSIUM SERPL-SCNC: 4.6 MMOL/L — SIGNIFICANT CHANGE UP (ref 3.5–5.3)
RBC # BLD: 4.35 M/UL — SIGNIFICANT CHANGE UP (ref 4.2–5.8)
RBC # FLD: 13.7 % — SIGNIFICANT CHANGE UP (ref 10.3–14.5)
SODIUM SERPL-SCNC: 142 MMOL/L — SIGNIFICANT CHANGE UP (ref 135–145)
WBC # BLD: 7.2 K/UL — SIGNIFICANT CHANGE UP (ref 3.8–10.5)
WBC # FLD AUTO: 7.2 K/UL — SIGNIFICANT CHANGE UP (ref 3.8–10.5)

## 2017-06-15 RX ORDER — OXYCODONE HYDROCHLORIDE 5 MG/1
5 TABLET ORAL EVERY 8 HOURS
Qty: 0 | Refills: 0 | Status: DISCONTINUED | OUTPATIENT
Start: 2017-06-15 | End: 2017-06-16

## 2017-06-15 RX ADMIN — SODIUM CHLORIDE 100 MILLILITER(S): 9 INJECTION INTRAMUSCULAR; INTRAVENOUS; SUBCUTANEOUS at 01:49

## 2017-06-15 RX ADMIN — BENZOCAINE AND MENTHOL 1 LOZENGE: 5; 1 LIQUID ORAL at 01:38

## 2017-06-15 RX ADMIN — TIZANIDINE 2 MILLIGRAM(S): 4 TABLET ORAL at 00:08

## 2017-06-15 RX ADMIN — Medication 100 MILLIGRAM(S): at 05:50

## 2017-06-15 RX ADMIN — Medication 100 MILLIGRAM(S): at 21:57

## 2017-06-15 RX ADMIN — POLYETHYLENE GLYCOL 3350 17 GRAM(S): 17 POWDER, FOR SOLUTION ORAL at 12:27

## 2017-06-15 RX ADMIN — Medication 650 MILLIGRAM(S): at 05:50

## 2017-06-15 RX ADMIN — Medication 650 MILLIGRAM(S): at 00:08

## 2017-06-15 RX ADMIN — Medication 25 MILLIGRAM(S): at 19:15

## 2017-06-15 RX ADMIN — OXYCODONE HYDROCHLORIDE 5 MILLIGRAM(S): 5 TABLET ORAL at 19:15

## 2017-06-15 RX ADMIN — TIZANIDINE 2 MILLIGRAM(S): 4 TABLET ORAL at 05:50

## 2017-06-15 RX ADMIN — OXYCODONE HYDROCHLORIDE 5 MILLIGRAM(S): 5 TABLET ORAL at 20:15

## 2017-06-15 RX ADMIN — Medication 100 MILLIGRAM(S): at 14:54

## 2017-06-15 RX ADMIN — FAMOTIDINE 20 MILLIGRAM(S): 10 INJECTION INTRAVENOUS at 05:50

## 2017-06-15 RX ADMIN — Medication 25 MILLIGRAM(S): at 05:51

## 2017-06-15 RX ADMIN — SENNA PLUS 2 TABLET(S): 8.6 TABLET ORAL at 21:57

## 2017-06-15 RX ADMIN — FAMOTIDINE 20 MILLIGRAM(S): 10 INJECTION INTRAVENOUS at 19:15

## 2017-06-15 RX ADMIN — Medication 650 MILLIGRAM(S): at 19:15

## 2017-06-15 RX ADMIN — Medication 650 MILLIGRAM(S): at 12:28

## 2017-06-15 NOTE — PROGRESS NOTE ADULT - SUBJECTIVE AND OBJECTIVE BOX
Patient got up and walked with PT. Was sitting in the chair this morning during rounds. Has not had good sleep while in the hospital. Pain is much improved. No current chest pain, shortness of breath, lightheadedness or dizziness. No nausea or vomiting. No headaches.    Vitals: ICU Vital Signs Last 24 Hrs  T(C): 37.6, Max: 37.6 (06-15 @ 05:43)  T(F): 99.7, Max: 99.7 (06-15 @ 05:43)  HR: 70 (61 - 70)  BP: 136/65 (96/52 - 136/65)  BP(mean): --  ABP: --  ABP(mean): --  RR: 17 (17 - 18)  SpO2: 97% (95% - 99%)                          13.0   8.88  )-----------( 201      ( 14 Jun 2017 05:30 )             40.8     06-14    141  |  103  |  15  ----------------------------<  98  4.1   |  26  |  0.80    Ca    8.9      14 Jun 2017 05:30        Exam:  Gen: NAD  Motor: 5/5 EHL/FHL/TA/Gastrocnemius  Sensory: SILT DP/SP/S/S/T nerve distributions  Vascular: 2+ Dorsalis Pedis pulse  Dressing: Clean, Dry, Intact  HV serosang 95/235    A/P: 47 year old M s/p L3-5 lami/fusion, L3-L4 discectomy  - Pain Control  - Regular Diet  - Venodynes  - TLSO for comfort  - PT/OT, OOB  - WBAT  - cont HV drain

## 2017-06-16 ENCOUNTER — TRANSCRIPTION ENCOUNTER (OUTPATIENT)
Age: 47
End: 2017-06-16

## 2017-06-16 VITALS
DIASTOLIC BLOOD PRESSURE: 69 MMHG | HEART RATE: 72 BPM | OXYGEN SATURATION: 95 % | SYSTOLIC BLOOD PRESSURE: 123 MMHG | RESPIRATION RATE: 18 BRPM | TEMPERATURE: 98 F

## 2017-06-16 LAB
BASOPHILS # BLD AUTO: 0.02 K/UL — SIGNIFICANT CHANGE UP (ref 0–0.2)
BASOPHILS NFR BLD AUTO: 0.2 % — SIGNIFICANT CHANGE UP (ref 0–2)
BUN SERPL-MCNC: 14 MG/DL — SIGNIFICANT CHANGE UP (ref 7–23)
CALCIUM SERPL-MCNC: 9.6 MG/DL — SIGNIFICANT CHANGE UP (ref 8.4–10.5)
CHLORIDE SERPL-SCNC: 100 MMOL/L — SIGNIFICANT CHANGE UP (ref 98–107)
CO2 SERPL-SCNC: 25 MMOL/L — SIGNIFICANT CHANGE UP (ref 22–31)
CREAT SERPL-MCNC: 0.82 MG/DL — SIGNIFICANT CHANGE UP (ref 0.5–1.3)
EOSINOPHIL # BLD AUTO: 0.15 K/UL — SIGNIFICANT CHANGE UP (ref 0–0.5)
EOSINOPHIL NFR BLD AUTO: 1.9 % — SIGNIFICANT CHANGE UP (ref 0–6)
GLUCOSE SERPL-MCNC: 88 MG/DL — SIGNIFICANT CHANGE UP (ref 70–99)
HCT VFR BLD CALC: 41.2 % — SIGNIFICANT CHANGE UP (ref 39–50)
HGB BLD-MCNC: 13.5 G/DL — SIGNIFICANT CHANGE UP (ref 13–17)
IMM GRANULOCYTES NFR BLD AUTO: 0.2 % — SIGNIFICANT CHANGE UP (ref 0–1.5)
LYMPHOCYTES # BLD AUTO: 2.27 K/UL — SIGNIFICANT CHANGE UP (ref 1–3.3)
LYMPHOCYTES # BLD AUTO: 28.3 % — SIGNIFICANT CHANGE UP (ref 13–44)
MCHC RBC-ENTMCNC: 28.7 PG — SIGNIFICANT CHANGE UP (ref 27–34)
MCHC RBC-ENTMCNC: 32.8 % — SIGNIFICANT CHANGE UP (ref 32–36)
MCV RBC AUTO: 87.5 FL — SIGNIFICANT CHANGE UP (ref 80–100)
MONOCYTES # BLD AUTO: 0.65 K/UL — SIGNIFICANT CHANGE UP (ref 0–0.9)
MONOCYTES NFR BLD AUTO: 8.1 % — SIGNIFICANT CHANGE UP (ref 2–14)
NEUTROPHILS # BLD AUTO: 4.92 K/UL — SIGNIFICANT CHANGE UP (ref 1.8–7.4)
NEUTROPHILS NFR BLD AUTO: 61.3 % — SIGNIFICANT CHANGE UP (ref 43–77)
PLATELET # BLD AUTO: 194 K/UL — SIGNIFICANT CHANGE UP (ref 150–400)
PMV BLD: 11.1 FL — SIGNIFICANT CHANGE UP (ref 7–13)
POTASSIUM SERPL-MCNC: 4.1 MMOL/L — SIGNIFICANT CHANGE UP (ref 3.5–5.3)
POTASSIUM SERPL-SCNC: 4.1 MMOL/L — SIGNIFICANT CHANGE UP (ref 3.5–5.3)
RBC # BLD: 4.71 M/UL — SIGNIFICANT CHANGE UP (ref 4.2–5.8)
RBC # FLD: 13.5 % — SIGNIFICANT CHANGE UP (ref 10.3–14.5)
SODIUM SERPL-SCNC: 140 MMOL/L — SIGNIFICANT CHANGE UP (ref 135–145)
WBC # BLD: 8.03 K/UL — SIGNIFICANT CHANGE UP (ref 3.8–10.5)
WBC # FLD AUTO: 8.03 K/UL — SIGNIFICANT CHANGE UP (ref 3.8–10.5)

## 2017-06-16 RX ORDER — DOCUSATE SODIUM 100 MG
1 CAPSULE ORAL
Qty: 0 | Refills: 0 | COMMUNITY
Start: 2017-06-16

## 2017-06-16 RX ORDER — POLYETHYLENE GLYCOL 3350 17 G/17G
17 POWDER, FOR SOLUTION ORAL
Qty: 0 | Refills: 0 | COMMUNITY
Start: 2017-06-16

## 2017-06-16 RX ORDER — OXYCODONE HYDROCHLORIDE 5 MG/1
1 TABLET ORAL
Qty: 42 | Refills: 0 | OUTPATIENT
Start: 2017-06-16 | End: 2017-06-23

## 2017-06-16 RX ADMIN — POLYETHYLENE GLYCOL 3350 17 GRAM(S): 17 POWDER, FOR SOLUTION ORAL at 12:13

## 2017-06-16 RX ADMIN — FAMOTIDINE 20 MILLIGRAM(S): 10 INJECTION INTRAVENOUS at 05:26

## 2017-06-16 RX ADMIN — OXYCODONE HYDROCHLORIDE 5 MILLIGRAM(S): 5 TABLET ORAL at 16:06

## 2017-06-16 RX ADMIN — Medication 100 MILLIGRAM(S): at 15:06

## 2017-06-16 RX ADMIN — OXYCODONE HYDROCHLORIDE 5 MILLIGRAM(S): 5 TABLET ORAL at 05:26

## 2017-06-16 RX ADMIN — BENZOCAINE AND MENTHOL 1 LOZENGE: 5; 1 LIQUID ORAL at 12:13

## 2017-06-16 RX ADMIN — Medication 650 MILLIGRAM(S): at 12:13

## 2017-06-16 RX ADMIN — Medication 650 MILLIGRAM(S): at 00:53

## 2017-06-16 RX ADMIN — Medication 100 MILLIGRAM(S): at 05:26

## 2017-06-16 RX ADMIN — Medication 650 MILLIGRAM(S): at 05:26

## 2017-06-16 RX ADMIN — Medication 25 MILLIGRAM(S): at 05:26

## 2017-06-16 RX ADMIN — OXYCODONE HYDROCHLORIDE 5 MILLIGRAM(S): 5 TABLET ORAL at 15:06

## 2017-06-16 NOTE — PROGRESS NOTE ADULT - SUBJECTIVE AND OBJECTIVE BOX
Pain is currently well controlled with pain medication. No current chest pain, shortness of breath, lightheadedness or dizziness. No nausea or vomiting. has been walking with PT and independently. ready to go home    Vitals: ICU Vital Signs Last 24 Hrs  T(C): 36.9, Max: 37.2 (06-15 @ 18:22)  T(F): 98.4, Max: 98.9 (06-15 @ 18:22)  HR: 71 (62 - 79)  BP: 128/69 (108/62 - 149/76)  BP(mean): --  ABP: --  ABP(mean): --  RR: 17 (17 - 18)  SpO2: 99% (97% - 100%)                          12.2   7.20  )-----------( 170      ( 15 Jovi 2017 07:00 )             37.8     06-15    142  |  104  |  14  ----------------------------<  101<H>  4.6   |  27  |  0.87    Ca    9.0      15 Jovi 2017 07:00        Exam:  Gen: NAD  Motor: 5/5 EHL/FHL/TA/Gastrocnemius  Sensory: SILT DP/SP/S/S/T nerve distributions  Vascular: 2+ Dorsalis Pedis pulse  Dressing: Clean, Dry, Intact  HV drain serosang, removed on rounds    A/P: 47 year old M s/p L3-L5 lami/psf, L3-L4 discectomy  - Pain Control  - Regular Diet  - Venodynes  - TLSO for comfort  - PT/OT, OOB  - WBAT  - d/c home

## 2017-06-16 NOTE — DISCHARGE NOTE ADULT - HOSPITAL COURSE
Patient s/p L3-L5 Laminectomy /PSIF L3-L4 Discectomy 6/13/17 . Patient tolerated the procedure well without any intraoperative complications. Patient tolerated physical therapy well and pain is controlled. Seen by medical attending for continuity of care and management and cleared for safe discharge. As per surgeon patient stable and ready for discharge.     Please follow up with  in 1-2 weeks. Call office to make an appointment. Please follow up with your PMD for continuity of care and management as medications may have changed. Do not take any NSAIDS (motrin, advil, ibuprofren, aleve), Aspirin, Antiinflammatory medications unless instructed by your orthopaedic surgeon to continue. Avoid any heavy lifting, bending, squatting, twisting motion. Keep dressing/incision clean, dry, intact. Any suture/staples to be removed on post op day # 14 at office visit. Weight bear as tolerated.

## 2017-06-16 NOTE — DISCHARGE NOTE ADULT - CONDITIONS AT DISCHARGE
Pt. is afebrile and offers no complaints. In no acute distress. Back  dressing: clean, dry and intact. Pt is ambulating ad ted, tolerating diet well, and voiding in adequate amounts.

## 2017-06-16 NOTE — DISCHARGE NOTE ADULT - CARE PLAN
Principal Discharge DX:	Spinal stenosis, unspecified spinal region  Goal:	pain control, surgical site healing, ambulation, return to ADL's  Instructions for follow-up, activity and diet:	Patient s/p L3-L5 Laminectomy /PSIF L3-L4 Discectomy 6/13/17 . Patient tolerated the procedure well without any intraoperative complications. Patient tolerated physical therapy well and pain is controlled. Seen by medical attending for continuity of care and management and cleared for safe discharge. As per surgeon patient stable and ready for discharge.     Please follow up with  in 1-2 weeks. Call office to make an appointment. Please follow up with your PMD for continuity of care and management as medications may have changed. Do not take any NSAIDS (motrin, advil, ibuprofren, aleve), Aspirin, Antiinflammatory medications unless instructed by your orthopaedic surgeon to continue. Avoid any heavy lifting, bending, squatting, twisting motion. Keep dressing/incision clean, dry, intact. Any suture/staples to be removed on post op day # 14 at office visit. Weight bear as tolerated.

## 2017-06-16 NOTE — DISCHARGE NOTE ADULT - CARE PROVIDER_API CALL
William Bingham (MD; DC), Orthopaedic Surgery  [address] [place] [address]  Phone: [phone]  Fax: [phone]

## 2017-06-16 NOTE — DISCHARGE NOTE ADULT - PLAN OF CARE
pain control, surgical site healing, ambulation, return to ADL's Patient s/p L3-L5 Laminectomy /PSIF L3-L4 Discectomy 6/13/17 . Patient tolerated the procedure well without any intraoperative complications. Patient tolerated physical therapy well and pain is controlled. Seen by medical attending for continuity of care and management and cleared for safe discharge. As per surgeon patient stable and ready for discharge.     Please follow up with  in 1-2 weeks. Call office to make an appointment. Please follow up with your PMD for continuity of care and management as medications may have changed. Do not take any NSAIDS (motrin, advil, ibuprofren, aleve), Aspirin, Antiinflammatory medications unless instructed by your orthopaedic surgeon to continue. Avoid any heavy lifting, bending, squatting, twisting motion. Keep dressing/incision clean, dry, intact. Any suture/staples to be removed on post op day # 14 at office visit. Weight bear as tolerated.

## 2017-06-16 NOTE — DISCHARGE NOTE ADULT - INSTRUCTIONS
Resume Home diet Make a follow up appointment with Dr. Bingham. Call MD if you develop a fever, or if there is redness, swelling, drainage or pain not relieved by pain medication. No heavy lifting, bending, or straining to move your bowels. Take over the counter stool softeners as needed to prevent constipation which may be caused by pain medication.

## 2017-06-16 NOTE — DISCHARGE NOTE ADULT - PATIENT PORTAL LINK FT
“You can access the [facility] Patient Portal, offered by [facility], by registering with the following website: [email or web address]”

## 2017-06-16 NOTE — DISCHARGE NOTE ADULT - MEDICATION SUMMARY - MEDICATIONS TO TAKE
I will START or STAY ON the medications listed below when I get home from the hospital:    Metoprolol 50 mg oral daily  --     -- Indication: For Home Meds    magnesium amino acids chelate  --  by mouth 250 mg daily  -- Indication: For home med     plant sterols  -- Last dose 6/5/17  -- Indication: For home meds    oxyCODONE 5 mg oral tablet  -- 1-2 tab(s) by mouth every 4 hours, As NeededPain MDD:10  -- Indication: For Pain     docusate sodium 100 mg oral capsule  -- 1 cap(s) by mouth 3 times a day  -- Indication: For Bowel    polyethylene glycol 3350 oral powder for reconstitution  -- 17 gram(s) by mouth once a day  -- Indication: For Bowel    Fish Oil  -- 1 tab  by mouth daily. Last dose 6/5/17  -- Indication: For home med     Vitamin D3 2000 intl units oral capsule  -- 1 cap(s) by mouth once a day  -- Indication: For home med

## 2017-06-23 LAB — SURGICAL PATHOLOGY STUDY: SIGNIFICANT CHANGE UP

## 2017-06-26 ENCOUNTER — APPOINTMENT (OUTPATIENT)
Dept: ORTHOPEDIC SURGERY | Facility: CLINIC | Age: 47
End: 2017-06-26

## 2017-07-24 ENCOUNTER — APPOINTMENT (OUTPATIENT)
Dept: ORTHOPEDIC SURGERY | Facility: CLINIC | Age: 47
End: 2017-07-24

## 2017-07-24 DIAGNOSIS — M51.36 OTHER INTERVERTEBRAL DISC DEGENERATION, LUMBAR REGION: ICD-10-CM

## 2017-09-25 ENCOUNTER — APPOINTMENT (OUTPATIENT)
Dept: ORTHOPEDIC SURGERY | Facility: CLINIC | Age: 47
End: 2017-09-25
Payer: COMMERCIAL

## 2017-09-25 PROCEDURE — 99213 OFFICE O/P EST LOW 20 MIN: CPT

## 2017-09-25 PROCEDURE — 72100 X-RAY EXAM L-S SPINE 2/3 VWS: CPT

## 2018-01-08 ENCOUNTER — APPOINTMENT (OUTPATIENT)
Dept: ORTHOPEDIC SURGERY | Facility: CLINIC | Age: 48
End: 2018-01-08
Payer: COMMERCIAL

## 2018-01-08 PROCEDURE — 72100 X-RAY EXAM L-S SPINE 2/3 VWS: CPT

## 2018-01-08 PROCEDURE — 99214 OFFICE O/P EST MOD 30 MIN: CPT

## 2018-01-18 ENCOUNTER — APPOINTMENT (OUTPATIENT)
Dept: SURGERY | Facility: CLINIC | Age: 48
End: 2018-01-18
Payer: COMMERCIAL

## 2018-01-18 VITALS
SYSTOLIC BLOOD PRESSURE: 159 MMHG | BODY MASS INDEX: 35.94 KG/M2 | HEART RATE: 68 BPM | OXYGEN SATURATION: 97 % | RESPIRATION RATE: 16 BRPM | WEIGHT: 280 LBS | HEIGHT: 74 IN | DIASTOLIC BLOOD PRESSURE: 94 MMHG | TEMPERATURE: 98.3 F

## 2018-01-18 DIAGNOSIS — I10 ESSENTIAL (PRIMARY) HYPERTENSION: ICD-10-CM

## 2018-01-18 DIAGNOSIS — I80.3 PHLEBITIS AND THROMBOPHLEBITIS OF LOWER EXTREMITIES, UNSPECIFIED: ICD-10-CM

## 2018-01-18 DIAGNOSIS — Z83.3 FAMILY HISTORY OF DIABETES MELLITUS: ICD-10-CM

## 2018-01-18 DIAGNOSIS — Z82.49 FAMILY HISTORY OF ISCHEMIC HEART DISEASE AND OTHER DISEASES OF THE CIRCULATORY SYSTEM: ICD-10-CM

## 2018-01-18 PROCEDURE — 99204 OFFICE O/P NEW MOD 45 MIN: CPT

## 2018-01-18 RX ORDER — ACETAMINOPHEN AND CODEINE 300; 30 MG/1; MG/1
300-30 TABLET ORAL
Qty: 8 | Refills: 0 | Status: DISCONTINUED | COMMUNITY
Start: 2017-10-20 | End: 2018-01-18

## 2018-03-28 ENCOUNTER — OUTPATIENT (OUTPATIENT)
Dept: OUTPATIENT SERVICES | Facility: HOSPITAL | Age: 48
LOS: 1 days | End: 2018-03-28
Payer: COMMERCIAL

## 2018-03-28 VITALS
TEMPERATURE: 99 F | DIASTOLIC BLOOD PRESSURE: 82 MMHG | HEIGHT: 74 IN | OXYGEN SATURATION: 97 % | WEIGHT: 276.02 LBS | RESPIRATION RATE: 14 BRPM | SYSTOLIC BLOOD PRESSURE: 140 MMHG | HEART RATE: 66 BPM

## 2018-03-28 DIAGNOSIS — K40.20 BILATERAL INGUINAL HERNIA, WITHOUT OBSTRUCTION OR GANGRENE, NOT SPECIFIED AS RECURRENT: ICD-10-CM

## 2018-03-28 DIAGNOSIS — Z98.1 ARTHRODESIS STATUS: Chronic | ICD-10-CM

## 2018-03-28 DIAGNOSIS — K40.90 UNILATERAL INGUINAL HERNIA, WITHOUT OBSTRUCTION OR GANGRENE, NOT SPECIFIED AS RECURRENT: ICD-10-CM

## 2018-03-28 DIAGNOSIS — Z01.818 ENCOUNTER FOR OTHER PREPROCEDURAL EXAMINATION: ICD-10-CM

## 2018-03-28 DIAGNOSIS — I80.9 PHLEBITIS AND THROMBOPHLEBITIS OF UNSPECIFIED SITE: Chronic | ICD-10-CM

## 2018-03-28 DIAGNOSIS — G47.33 OBSTRUCTIVE SLEEP APNEA (ADULT) (PEDIATRIC): ICD-10-CM

## 2018-03-28 PROCEDURE — G0463: CPT

## 2018-03-28 RX ORDER — SODIUM CHLORIDE 9 MG/ML
3 INJECTION INTRAMUSCULAR; INTRAVENOUS; SUBCUTANEOUS EVERY 8 HOURS
Qty: 0 | Refills: 0 | Status: DISCONTINUED | OUTPATIENT
Start: 2018-04-11 | End: 2018-04-26

## 2018-03-28 RX ORDER — OMEGA-3 ACID ETHYL ESTERS 1 G
0 CAPSULE ORAL
Qty: 0 | Refills: 0 | COMMUNITY

## 2018-03-28 RX ORDER — LIDOCAINE HCL 20 MG/ML
0.2 VIAL (ML) INJECTION ONCE
Qty: 0 | Refills: 0 | Status: DISCONTINUED | OUTPATIENT
Start: 2018-04-11 | End: 2018-04-26

## 2018-03-28 RX ORDER — ACETAMINOPHEN 500 MG
1000 TABLET ORAL ONCE
Qty: 0 | Refills: 0 | Status: COMPLETED | OUTPATIENT
Start: 2018-04-11 | End: 2018-04-11

## 2018-03-28 RX ORDER — CHOLECALCIFEROL (VITAMIN D3) 125 MCG
1 CAPSULE ORAL
Qty: 0 | Refills: 0 | COMMUNITY

## 2018-03-28 RX ORDER — MAGNESIUM OXIDE/MAG AA CHELATE 133 MG
0 TABLET ORAL
Qty: 0 | Refills: 0 | COMMUNITY

## 2018-03-28 NOTE — H&P PST ADULT - PMH
GERD (gastroesophageal reflux disease)    Hypertension    Palpitations  dx. 2015 on metoprolol  Phlebitis  right lower extremity  Spinal stenosis GERD (gastroesophageal reflux disease)    Hyperlipidemia  no current medications  Hypertension    Inguinal hernia, bilateral    Obesity (BMI 30-39.9)    Palpitations  dx. 2015 on metoprolol  Phlebitis  right lower extremity - age 10  Spinal stenosis

## 2018-03-28 NOTE — H&P PST ADULT - NSANTHOSAYNRD_GEN_A_CORE
No. JOSE screening performed.  STOP BANG Legend: 0-2 = LOW Risk; 3-4 = INTERMEDIATE Risk; 5-8 = HIGH Risk/negative Sleep Study 2016 per pt, negative Sleep Study 2016; neck = 19 inches/No. JOSE screening performed.  STOP BANG Legend: 0-2 = LOW Risk; 3-4 = INTERMEDIATE Risk; 5-8 = HIGH Risk

## 2018-03-28 NOTE — H&P PST ADULT - BLOOD AVOIDANCE/RESTRICTIONS, PROFILE
Generally recommend to cover lead source, not sanding due to causing lead to get into the air. Painting  Should  Be good, covering with carpet would be additional protecting.   Do not recommend  Just covering with carpet, need to paint first none

## 2018-03-28 NOTE — H&P PST ADULT - HISTORY OF PRESENT ILLNESS
46 yo with PMH hypertension presents to pre surgical testing.  Pt reports he has been experiencing back pain for the past 6 years, progressively becoming worse. Last MRI done April 2017, showed spondylolisthises and spinal stenosis.  Pt is scheduled for L3-L5 lumbar laminectomy and fusion with instrumentation on 6/13/17. 49 yo male with h/o HTN, HLD and bilateral inguinal hernias noted on CT scan in 2015. Pt states that he has been having pain in right groin, particularly with coughing, and is scheduled for right inguinal hernia repair on 4/11/18.

## 2018-03-28 NOTE — H&P PST ADULT - PSH
Phlebitis  right lower extremity, excised part of vein 1981 History of lumbar spinal fusion  6/2017 - fusion and laminectomy  Phlebitis  right lower extremity, excised part of vein 1981

## 2018-03-28 NOTE — H&P PST ADULT - ACTIVITY
ADLs, cardio 30 minutes 2x/week, weight training 30 mins 4x/week ADLs, Gym 4x weekly for 60 minutes - weights/cardio

## 2018-04-10 ENCOUNTER — TRANSCRIPTION ENCOUNTER (OUTPATIENT)
Age: 48
End: 2018-04-10

## 2018-04-11 ENCOUNTER — APPOINTMENT (OUTPATIENT)
Dept: SURGERY | Facility: HOSPITAL | Age: 48
End: 2018-04-11
Payer: COMMERCIAL

## 2018-04-11 ENCOUNTER — OUTPATIENT (OUTPATIENT)
Dept: OUTPATIENT SERVICES | Facility: HOSPITAL | Age: 48
LOS: 1 days | End: 2018-04-11
Payer: COMMERCIAL

## 2018-04-11 VITALS
SYSTOLIC BLOOD PRESSURE: 131 MMHG | WEIGHT: 276.02 LBS | DIASTOLIC BLOOD PRESSURE: 80 MMHG | HEART RATE: 60 BPM | RESPIRATION RATE: 16 BRPM | TEMPERATURE: 99 F | OXYGEN SATURATION: 97 % | HEIGHT: 74 IN

## 2018-04-11 VITALS
SYSTOLIC BLOOD PRESSURE: 122 MMHG | TEMPERATURE: 98 F | HEART RATE: 69 BPM | DIASTOLIC BLOOD PRESSURE: 76 MMHG | RESPIRATION RATE: 18 BRPM | OXYGEN SATURATION: 97 %

## 2018-04-11 DIAGNOSIS — I80.9 PHLEBITIS AND THROMBOPHLEBITIS OF UNSPECIFIED SITE: Chronic | ICD-10-CM

## 2018-04-11 DIAGNOSIS — Z98.1 ARTHRODESIS STATUS: Chronic | ICD-10-CM

## 2018-04-11 DIAGNOSIS — Z01.818 ENCOUNTER FOR OTHER PREPROCEDURAL EXAMINATION: ICD-10-CM

## 2018-04-11 DIAGNOSIS — K40.20 BILATERAL INGUINAL HERNIA, WITHOUT OBSTRUCTION OR GANGRENE, NOT SPECIFIED AS RECURRENT: ICD-10-CM

## 2018-04-11 PROCEDURE — 49505 PRP I/HERN INIT REDUC >5 YR: CPT | Mod: RT

## 2018-04-11 PROCEDURE — C1781: CPT

## 2018-04-11 RX ORDER — HYDROMORPHONE HYDROCHLORIDE 2 MG/ML
0.25 INJECTION INTRAMUSCULAR; INTRAVENOUS; SUBCUTANEOUS
Qty: 0 | Refills: 0 | Status: DISCONTINUED | OUTPATIENT
Start: 2018-04-11 | End: 2018-04-11

## 2018-04-11 RX ORDER — CELECOXIB 200 MG/1
200 CAPSULE ORAL ONCE
Qty: 0 | Refills: 0 | Status: DISCONTINUED | OUTPATIENT
Start: 2018-04-11 | End: 2018-04-26

## 2018-04-11 RX ORDER — ONDANSETRON 8 MG/1
4 TABLET, FILM COATED ORAL ONCE
Qty: 0 | Refills: 0 | Status: DISCONTINUED | OUTPATIENT
Start: 2018-04-11 | End: 2018-04-26

## 2018-04-11 RX ORDER — SODIUM CHLORIDE 9 MG/ML
1000 INJECTION, SOLUTION INTRAVENOUS
Qty: 0 | Refills: 0 | Status: DISCONTINUED | OUTPATIENT
Start: 2018-04-11 | End: 2018-04-26

## 2018-04-11 RX ORDER — CELECOXIB 200 MG/1
200 CAPSULE ORAL ONCE
Qty: 0 | Refills: 0 | Status: COMPLETED | OUTPATIENT
Start: 2018-04-11 | End: 2018-04-11

## 2018-04-11 RX ORDER — OXYCODONE HYDROCHLORIDE 5 MG/1
10 TABLET ORAL ONCE
Qty: 0 | Refills: 0 | Status: DISCONTINUED | OUTPATIENT
Start: 2018-04-11 | End: 2018-04-11

## 2018-04-11 RX ORDER — APREPITANT 80 MG/1
40 CAPSULE ORAL ONCE
Qty: 0 | Refills: 0 | Status: DISCONTINUED | OUTPATIENT
Start: 2018-04-11 | End: 2018-04-26

## 2018-04-11 RX ADMIN — CELECOXIB 200 MILLIGRAM(S): 200 CAPSULE ORAL at 10:09

## 2018-04-11 RX ADMIN — Medication 1000 MILLIGRAM(S): at 10:09

## 2018-04-11 NOTE — ASU PATIENT PROFILE, ADULT - PSH
History of lumbar spinal fusion  6/2017 - fusion and laminectomy  Phlebitis  right lower extremity, excised part of vein 1981

## 2018-04-11 NOTE — ASU DISCHARGE PLAN (ADULT/PEDIATRIC). - NURSING INSTRUCTIONS
call if not void in 8 hours , for excessive bleeding, pain ,swelling or fever greater than 101. follow 's instructions

## 2018-04-11 NOTE — ASU PATIENT PROFILE, ADULT - PMH
GERD (gastroesophageal reflux disease)    Hyperlipidemia  no current medications  Hypertension    Inguinal hernia, bilateral    Obesity (BMI 30-39.9)    Palpitations  dx. 2015 on metoprolol  Phlebitis  right lower extremity - age 10  Spinal stenosis

## 2018-04-11 NOTE — PRE-ANESTHESIA EVALUATION ADULT - NSANTHOSAYNRD_GEN_A_CORE
per pt, negative Sleep Study 2016; neck = 19 inches/No. JOSE screening performed.  STOP BANG Legend: 0-2 = LOW Risk; 3-4 = INTERMEDIATE Risk; 5-8 = HIGH Risk

## 2018-04-11 NOTE — BRIEF OPERATIVE NOTE - PROCEDURE
<<-----Click on this checkbox to enter Procedure Inguinal hernia repair with mesh  04/11/2018  Right  Active  AGAINES

## 2018-04-11 NOTE — ASU DISCHARGE PLAN (ADULT/PEDIATRIC). - MEDICATION SUMMARY - MEDICATIONS TO TAKE
I will START or STAY ON the medications listed below when I get home from the hospital:    Percocet 5/325 oral tablet  -- 1 tab(s) by mouth every 4 hours, As Needed MDD:6 tabs  -- Caution federal law prohibits the transfer of this drug to any person other  than the person for whom it was prescribed.  May cause drowsiness.  Alcohol may intensify this effect.  Use care when operating dangerous machinery.  This prescription cannot be refilled.  This product contains acetaminophen.  Do not use  with any other product containing acetaminophen to prevent possible liver damage.  Using more of this medication than prescribed may cause serious breathing problems.    -- Indication: For AS neededfor post-op pain    enalapril 5 mg oral tablet  -- 1 tab(s) by mouth 2 times a day  -- Indication: For Home medication    Toprol-XL 25 mg oral tablet, extended release  -- 1 tab(s) by mouth once a day  -- Indication: For Home medication    famotidine 20 mg oral tablet  -- 1 tab(s) by mouth on 4/10/18 pm and 1 tab by mouth on 4/11/18 am  -- Indication: For Home medication

## 2018-04-17 ENCOUNTER — TRANSCRIPTION ENCOUNTER (OUTPATIENT)
Age: 48
End: 2018-04-17

## 2018-04-26 ENCOUNTER — APPOINTMENT (OUTPATIENT)
Dept: SURGERY | Facility: CLINIC | Age: 48
End: 2018-04-26
Payer: COMMERCIAL

## 2018-04-26 DIAGNOSIS — Z98.890 OTHER SPECIFIED POSTPROCEDURAL STATES: ICD-10-CM

## 2018-05-03 ENCOUNTER — APPOINTMENT (OUTPATIENT)
Dept: SURGERY | Facility: CLINIC | Age: 48
End: 2018-05-03
Payer: COMMERCIAL

## 2018-05-03 VITALS
OXYGEN SATURATION: 97 % | DIASTOLIC BLOOD PRESSURE: 84 MMHG | HEART RATE: 62 BPM | RESPIRATION RATE: 15 BRPM | SYSTOLIC BLOOD PRESSURE: 128 MMHG | TEMPERATURE: 97.8 F

## 2018-05-03 DIAGNOSIS — Z09 ENCOUNTER FOR FOLLOW-UP EXAMINATION AFTER COMPLETED TREATMENT FOR CONDITIONS OTHER THAN MALIGNANT NEOPLASM: ICD-10-CM

## 2018-05-03 PROCEDURE — 99024 POSTOP FOLLOW-UP VISIT: CPT

## 2018-05-03 RX ORDER — METOPROLOL TARTRATE 25 MG/1
25 TABLET, FILM COATED ORAL
Refills: 0 | Status: ACTIVE | COMMUNITY

## 2018-05-03 RX ORDER — METOPROLOL SUCCINATE 50 MG/1
50 TABLET, EXTENDED RELEASE ORAL
Qty: 90 | Refills: 0 | Status: DISCONTINUED | COMMUNITY
Start: 2017-04-24 | End: 2018-05-03

## 2018-06-21 ENCOUNTER — APPOINTMENT (OUTPATIENT)
Dept: OTOLARYNGOLOGY | Facility: CLINIC | Age: 48
End: 2018-06-21

## 2018-07-09 ENCOUNTER — APPOINTMENT (OUTPATIENT)
Dept: OTOLARYNGOLOGY | Facility: CLINIC | Age: 48
End: 2018-07-09
Payer: COMMERCIAL

## 2018-07-09 VITALS
SYSTOLIC BLOOD PRESSURE: 131 MMHG | WEIGHT: 277 LBS | HEART RATE: 63 BPM | BODY MASS INDEX: 35.55 KG/M2 | HEIGHT: 74 IN | DIASTOLIC BLOOD PRESSURE: 84 MMHG

## 2018-07-09 DIAGNOSIS — H90.3 SENSORINEURAL HEARING LOSS, BILATERAL: ICD-10-CM

## 2018-07-09 PROCEDURE — 31231 NASAL ENDOSCOPY DX: CPT

## 2018-07-09 PROCEDURE — 92557 COMPREHENSIVE HEARING TEST: CPT

## 2018-07-09 PROCEDURE — 92567 TYMPANOMETRY: CPT

## 2018-07-09 PROCEDURE — 99204 OFFICE O/P NEW MOD 45 MIN: CPT | Mod: 25

## 2018-07-16 PROBLEM — M48.00 SPINAL STENOSIS, SITE UNSPECIFIED: Chronic | Status: ACTIVE | Noted: 2017-06-05

## 2018-07-16 PROBLEM — R00.2 PALPITATIONS: Chronic | Status: ACTIVE | Noted: 2017-06-05

## 2018-07-16 PROBLEM — I80.9 PHLEBITIS AND THROMBOPHLEBITIS OF UNSPECIFIED SITE: Chronic | Status: ACTIVE | Noted: 2017-06-05

## 2018-07-16 PROBLEM — K21.9 GASTRO-ESOPHAGEAL REFLUX DISEASE WITHOUT ESOPHAGITIS: Chronic | Status: ACTIVE | Noted: 2017-06-05

## 2018-07-18 ENCOUNTER — FORM ENCOUNTER (OUTPATIENT)
Age: 48
End: 2018-07-18

## 2018-07-18 PROBLEM — I10 ESSENTIAL (PRIMARY) HYPERTENSION: Chronic | Status: ACTIVE | Noted: 2018-03-28

## 2018-07-18 PROBLEM — E66.9 OBESITY, UNSPECIFIED: Chronic | Status: ACTIVE | Noted: 2018-03-28

## 2018-07-18 PROBLEM — K40.20 BILATERAL INGUINAL HERNIA, WITHOUT OBSTRUCTION OR GANGRENE, NOT SPECIFIED AS RECURRENT: Chronic | Status: ACTIVE | Noted: 2018-03-28

## 2018-07-19 ENCOUNTER — APPOINTMENT (OUTPATIENT)
Dept: CT IMAGING | Facility: CLINIC | Age: 48
End: 2018-07-19
Payer: COMMERCIAL

## 2018-07-19 ENCOUNTER — OUTPATIENT (OUTPATIENT)
Dept: OUTPATIENT SERVICES | Facility: HOSPITAL | Age: 48
LOS: 1 days | End: 2018-07-19
Payer: COMMERCIAL

## 2018-07-19 DIAGNOSIS — I80.9 PHLEBITIS AND THROMBOPHLEBITIS OF UNSPECIFIED SITE: Chronic | ICD-10-CM

## 2018-07-19 DIAGNOSIS — Z98.1 ARTHRODESIS STATUS: Chronic | ICD-10-CM

## 2018-07-19 DIAGNOSIS — Z00.8 ENCOUNTER FOR OTHER GENERAL EXAMINATION: ICD-10-CM

## 2018-07-19 DIAGNOSIS — J34.2 DEVIATED NASAL SEPTUM: ICD-10-CM

## 2018-07-19 PROCEDURE — 70486 CT MAXILLOFACIAL W/O DYE: CPT

## 2018-07-19 PROCEDURE — 70486 CT MAXILLOFACIAL W/O DYE: CPT | Mod: 26

## 2018-07-23 PROBLEM — Z00.00 ENCOUNTER FOR PREVENTIVE HEALTH EXAMINATION: Noted: 2018-07-23

## 2018-08-01 ENCOUNTER — APPOINTMENT (OUTPATIENT)
Dept: OTOLARYNGOLOGY | Facility: CLINIC | Age: 48
End: 2018-08-01

## 2018-10-15 NOTE — OCCUPATIONAL THERAPY INITIAL EVALUATION ADULT - LEVEL OF INDEPENDENCE: DRESS LOWER BODY, OT EVAL
PT HAD EYEBROWS WAXED AND TINTED AND HAS DEVELOPED A REACTION TO WHAT SHE THINK MAYBE THE TINT APPLIED. BILATERAL EYEBROW ITCHING AND DRAINAGE. She appears well-nourished. HENT:   Right Ear: External ear normal.   Left Ear: External ear normal.   Eyes: EOM are normal. Pupils are equal, round, and reactive to light. Right eye exhibits no discharge. Left eye exhibits no discharge. Neck: Normal range of motion. Neck supple. No JVD present. No tracheal deviation present. No thyromegaly present. Cardiovascular: Normal rate, regular rhythm and normal heart sounds. No murmur heard. Pulmonary/Chest: Effort normal and breath sounds normal. No respiratory distress. She has no wheezes. She has no rales. Neurological: She is alert and oriented to person, place, and time. No cranial nerve deficit. Assessment/Plan:  Kathleen Be was seen today for 3 month follow-up and other. Diagnoses and all orders for this visit:    Hot flashes due to menopause  -     estradiol (ESTRACE) 0.5 MG tablet; Take 1 tablet by mouth daily Patient had a hysterectomy. Other orders  -     LORazepam (ATIVAN) 0.5 MG tablet; Take 1 tablet by mouth daily as needed for Anxiety . -     oxyCODONE-acetaminophen (PERCOCET) 5-325 MG per tablet; Take 1 tablet by mouth daily as needed for Pain  Migraine Headaches G43.119. Earliest Fill Date: 12/7/17      Return in about 6 weeks (around 1/18/2018) for hot flashes. moderate assist (50% patients effort)

## 2018-10-23 NOTE — PATIENT PROFILE ADULT. - HEALTHCARE QUESTIONS, PROFILE
Detail Level: Simple Additional Notes: patient has completed three months of therapy and is starting his third month. \\nHe was heavily counseled on a full healthy diet including healthy fats each day and every time he takes his claravis. He is now eating Greek yogurt and almonds as his healthy fats with his medications. none

## 2019-02-25 ENCOUNTER — RX RENEWAL (OUTPATIENT)
Age: 49
End: 2019-02-25

## 2019-02-25 ENCOUNTER — APPOINTMENT (OUTPATIENT)
Dept: ORTHOPEDIC SURGERY | Facility: CLINIC | Age: 49
End: 2019-02-25
Payer: COMMERCIAL

## 2019-02-25 VITALS
HEIGHT: 74 IN | DIASTOLIC BLOOD PRESSURE: 87 MMHG | BODY MASS INDEX: 35.94 KG/M2 | WEIGHT: 280 LBS | HEART RATE: 68 BPM | SYSTOLIC BLOOD PRESSURE: 143 MMHG

## 2019-02-25 DIAGNOSIS — M48.07 SPINAL STENOSIS, LUMBOSACRAL REGION: ICD-10-CM

## 2019-02-25 DIAGNOSIS — M43.16 SPONDYLOLISTHESIS, LUMBAR REGION: ICD-10-CM

## 2019-02-25 PROCEDURE — 99214 OFFICE O/P EST MOD 30 MIN: CPT

## 2019-02-25 PROCEDURE — 72100 X-RAY EXAM L-S SPINE 2/3 VWS: CPT

## 2019-02-25 NOTE — HISTORY OF PRESENT ILLNESS
[Pain] : pain [Stable] : stable [All Other ROS Normal] : All other review of systems are negative except as noted [Headache] : no headache [Dizziness] : no dizziness [Fainting] : no fainting [FreeTextEntry1] : -Lumbar laminectomy and fusion-June 2017\par right sided LBP\par surgery helped tremendously [FreeTextEntry2] : s/p lumbar lami/fusion June 2017.\par LBP x 2 months.  Intermittent sharp pains in the back.\par He has stiffness when he stands for a long period of time.\par Otherwise he is doing well.\par Works out at the gym 3-5 days week.\par Denies radicular symptoms.\par  No fever chills sweats nausea vomiting no bowel,  no recent weight loss or gain no night pain. This history is in addition to the intake form that I personally reviewed.

## 2019-02-25 NOTE — PHYSICAL EXAM
[UE/LE] : Sensory: Intact in bilateral upper & lower extremities [ALL] : dorsalis pedis, posterior tibial, femoral, popliteal, and radial 2+ and symmetric bilaterally [Normal] : Gait: normal [Antalgic] : not antalgic [de-identified] : right SI joint, otherwise, 5 out of 5 motor strength, sensation is intact and symmetrical full range of motion flexion extension and rotation, no palpatory tenderness full range of motion of hips knees shoulders and elbows (all four extremities), no atrophy, negative straight leg raise, no pathological reflexes, no swelling, normal ambulation, no apparent distress skin is intact, no palpable lymph nodes, no upper or lower extremity instability, alert and oriented x3 and normal mood. Normal finger-to nose test.  [de-identified] : AP/lat lumbar-L4-5 decompression and fusion-reviewed with the patient. \par L3-4 degeneration unchanged from prior. [Poor Appearance] : well-appearing [Acute Distress] : not in acute distress [Obese] : not obese [Abl Mood] : in a normal mood [Abl Affect] : with normal affect [Poor Coordination] : normal coordination [Disorientation] : oriented x 3 [FreeTextEntry2] : 5 out of 5 motor strength, sensation is intact and symmetrical full range of motion flexion extension and rotation, no palpatory tenderness full range of motion of hips knees shoulders and elbows (all four extremities), no atrophy, negative straight leg raise, no pathological reflexes, no swelling, normal ambulation, no apparent distress skin is intact, no palpable lymph nodes, no upper or lower extremity instability, alert and oriented x3 and normal mood. Normal finger-to nose test.

## 2019-02-25 NOTE — DISCUSSION/SUMMARY
[de-identified] : 2 years post-op laminectomy and fusion-doing well.\par We discussed all options.\par lumbar sprain and strain\par We went to the do's and don'ts.\par Voltaren\par All questions were answered, all alternatives discussed and the patient is in complete agreement with that plan. Follow-up appointment as instructed. Any issues and the patient will call or come in sooner.

## 2019-03-05 ENCOUNTER — TRANSCRIPTION ENCOUNTER (OUTPATIENT)
Age: 49
End: 2019-03-05

## 2019-03-25 ENCOUNTER — APPOINTMENT (OUTPATIENT)
Dept: OTOLARYNGOLOGY | Facility: CLINIC | Age: 49
End: 2019-03-25
Payer: COMMERCIAL

## 2019-03-25 VITALS
HEIGHT: 74 IN | DIASTOLIC BLOOD PRESSURE: 85 MMHG | WEIGHT: 280 LBS | SYSTOLIC BLOOD PRESSURE: 147 MMHG | HEART RATE: 75 BPM | BODY MASS INDEX: 35.94 KG/M2

## 2019-03-25 DIAGNOSIS — J34.89 OTHER SPECIFIED DISORDERS OF NOSE AND NASAL SINUSES: ICD-10-CM

## 2019-03-25 DIAGNOSIS — J34.2 DEVIATED NASAL SEPTUM: ICD-10-CM

## 2019-03-25 PROCEDURE — 31231 NASAL ENDOSCOPY DX: CPT

## 2019-03-25 PROCEDURE — 99214 OFFICE O/P EST MOD 30 MIN: CPT | Mod: 25

## 2019-03-25 NOTE — HISTORY OF PRESENT ILLNESS
[de-identified] : 48 y/o w/ hx of a deviated septum and vertigo (7/18) who is here to talk about possible surgery for his deviated septum. He states it is hard for him to breath through is nostrils b/l. He states he has chronic nasal congestion. He states not being able to breath on a daily basis is affecting his daily life activities. His vertigo has also been acting up on him for the past week, but has not been affecting his life style. Pt has no ear pain, ear drainage, hearing loss, tinnitus, nasal discharge, epistaxis, sinus infections, facial pain, facial pressure, throat pain, dysphagia or fevers\par

## 2019-03-25 NOTE — ASSESSMENT
[FreeTextEntry1] : Patient follows up with workup in the past has a deviated septum sinuses were clear as per the CAT scan has decided he wants to proceed with a septoplasty turbinoplasty disease gotten to the point where his breathing is bothering him. He is tried nasal sprays were no significant improvement. Endoscopically S. shaped septum was once again confirmed we will proceed with a septoplasty turbinoplasty risks and benefits were discussed and accepted

## 2019-03-25 NOTE — PHYSICAL EXAM
[Nasal Endoscopy Performed] : nasal endoscopy was performed, see procedure section for findings [] : septum deviated bilaterally [Normal] : mucosa is normal [Midline] : trachea located in midline position [de-identified] : congestion b/l;

## 2019-03-25 NOTE — REASON FOR VISIT
[Subsequent Evaluation] : a subsequent evaluation for [Vertigo] : vertigo [Nasal Obstruction] : nasal obstruction [FreeTextEntry2] : deviated septum

## 2019-05-14 ENCOUNTER — OUTPATIENT (OUTPATIENT)
Dept: OUTPATIENT SERVICES | Facility: HOSPITAL | Age: 49
LOS: 1 days | End: 2019-05-14
Payer: COMMERCIAL

## 2019-05-14 VITALS
WEIGHT: 279.99 LBS | TEMPERATURE: 98 F | SYSTOLIC BLOOD PRESSURE: 140 MMHG | DIASTOLIC BLOOD PRESSURE: 80 MMHG | HEART RATE: 65 BPM | OXYGEN SATURATION: 98 % | HEIGHT: 73 IN | RESPIRATION RATE: 16 BRPM

## 2019-05-14 DIAGNOSIS — I80.9 PHLEBITIS AND THROMBOPHLEBITIS OF UNSPECIFIED SITE: Chronic | ICD-10-CM

## 2019-05-14 DIAGNOSIS — J34.2 DEVIATED NASAL SEPTUM: ICD-10-CM

## 2019-05-14 DIAGNOSIS — Z98.1 ARTHRODESIS STATUS: Chronic | ICD-10-CM

## 2019-05-14 DIAGNOSIS — Z98.890 OTHER SPECIFIED POSTPROCEDURAL STATES: Chronic | ICD-10-CM

## 2019-05-14 LAB
ANION GAP SERPL CALC-SCNC: 11 MMO/L — SIGNIFICANT CHANGE UP (ref 7–14)
BUN SERPL-MCNC: 28 MG/DL — HIGH (ref 7–23)
CALCIUM SERPL-MCNC: 10.4 MG/DL — SIGNIFICANT CHANGE UP (ref 8.4–10.5)
CHLORIDE SERPL-SCNC: 102 MMOL/L — SIGNIFICANT CHANGE UP (ref 98–107)
CO2 SERPL-SCNC: 26 MMOL/L — SIGNIFICANT CHANGE UP (ref 22–31)
CREAT SERPL-MCNC: 0.92 MG/DL — SIGNIFICANT CHANGE UP (ref 0.5–1.3)
GLUCOSE SERPL-MCNC: 72 MG/DL — SIGNIFICANT CHANGE UP (ref 70–99)
POTASSIUM SERPL-MCNC: 4.6 MMOL/L — SIGNIFICANT CHANGE UP (ref 3.5–5.3)
POTASSIUM SERPL-SCNC: 4.6 MMOL/L — SIGNIFICANT CHANGE UP (ref 3.5–5.3)
SODIUM SERPL-SCNC: 139 MMOL/L — SIGNIFICANT CHANGE UP (ref 135–145)

## 2019-05-14 PROCEDURE — 93010 ELECTROCARDIOGRAM REPORT: CPT

## 2019-05-14 RX ORDER — METOPROLOL TARTRATE 50 MG
1 TABLET ORAL
Qty: 0 | Refills: 0 | DISCHARGE

## 2019-05-14 RX ORDER — FAMOTIDINE 10 MG/ML
1 INJECTION INTRAVENOUS
Qty: 0 | Refills: 0 | DISCHARGE

## 2019-05-14 NOTE — H&P PST ADULT - MUSCULOSKELETAL
details… detailed exam no calf tenderness/normal strength ROM intact/no joint erythema/no joint warmth/normal strength/no calf tenderness/no joint swelling

## 2019-05-14 NOTE — H&P PST ADULT - LYMPHATIC
anterior cervical R/anterior cervical L anterior cervical L/supraclavicular L/posterior cervical R/anterior cervical R/supraclavicular R/posterior cervical L

## 2019-05-14 NOTE — H&P PST ADULT - NSICDXPASTSURGICALHX_GEN_ALL_CORE_FT
PAST SURGICAL HISTORY:  History of lumbar spinal fusion 6/2017 - fusion and laminectomy    Phlebitis right lower extremity, excised part of vein 1981    S/P hernia repair right 3/2018

## 2019-05-14 NOTE — H&P PST ADULT - NSANTHOSAYNRD_GEN_A_CORE
No. JOSE screening performed.  STOP BANG Legend: 0-2 = LOW Risk; 3-4 = INTERMEDIATE Risk; 5-8 = HIGH Risk/per pt, negative Sleep Study 2016

## 2019-05-14 NOTE — H&P PST ADULT - HISTORY OF PRESENT ILLNESS
50 yo male with PMH hypertension and hyperlipidemia presents to pre surgical testing.  Pt reports he has had nasal congestion for 30 years that has been worsening over the past year.  Pt is scheduled for septoplasty, bilateral turbinoplasty on 5/29/19.

## 2019-05-14 NOTE — H&P PST ADULT - NSICDXPROBLEM_GEN_ALL_CORE_FT
PROBLEM DIAGNOSES  Problem: Deviated septum  Assessment and Plan: Pt is scheduled for septoplasty, bilateral turbinoplasty on 5/29/19.   Pre op instructions given and pt able to verbalize understanding.   Pt met STOP BANG score for JOSE precaution. OR booking notified via fax.   Pt instructed to take metoprolol and olmesartan AM of surgery with a sip of water.

## 2019-05-14 NOTE — H&P PST ADULT - NEGATIVE MUSCULOSKELETAL SYMPTOMS
no leg pain L/no neck pain/no arm pain L/no arm pain R/no leg pain R/no muscle weakness/no back pain/no joint swelling/no muscle cramps/no stiffness/no myalgia/no arthralgia

## 2019-05-14 NOTE — H&P PST ADULT - NSICDXPASTMEDICALHX_GEN_ALL_CORE_FT
PAST MEDICAL HISTORY:  Deviated septum     GERD (gastroesophageal reflux disease)     Hyperlipidemia     Hypertension     Inguinal hernia, bilateral     Obesity (BMI 30-39.9)     Palpitations dx. 2015 on metoprolol    Phlebitis right lower extremity - age 10    Spinal stenosis

## 2019-05-14 NOTE — H&P PST ADULT - NSICDXFAMILYHX_GEN_ALL_CORE_FT
FAMILY HISTORY:  Father  Still living? Yes, Estimated age: Age Unknown  Family history of diabetes mellitus, Age at diagnosis: Age Unknown  Family history of ischemic heart disease, Age at diagnosis: Age Unknown  Family history of Parkinson disease, Age at diagnosis: Age Unknown    Grandparent  Still living? No  Family history of ischemic heart disease, Age at diagnosis: Age Unknown

## 2019-05-14 NOTE — H&P PST ADULT - NEGATIVE NEUROLOGICAL SYMPTOMS
no weakness/no headache/no paresthesias/no generalized seizures/no vertigo/no loss of sensation/no difficulty walking/no transient paralysis

## 2019-05-14 NOTE — H&P PST ADULT - RS GEN PE MLT RESP DETAILS PC
clear to auscultation bilaterally/good air movement/breath sounds equal/respirations non-labored clear to auscultation bilaterally/airway patent/respirations non-labored/breath sounds equal/good air movement

## 2019-05-14 NOTE — H&P PST ADULT - NEGATIVE CARDIOVASCULAR SYMPTOMS
no peripheral edema/no dyspnea on exertion/no palpitations/no chest pain/no claudication/no paroxysmal nocturnal dyspnea/no orthopnea

## 2019-05-15 LAB
HCT VFR BLD CALC: 44.1 % — SIGNIFICANT CHANGE UP (ref 39–50)
HGB BLD-MCNC: 14.5 G/DL — SIGNIFICANT CHANGE UP (ref 13–17)
MCHC RBC-ENTMCNC: 28.7 PG — SIGNIFICANT CHANGE UP (ref 27–34)
MCHC RBC-ENTMCNC: 32.9 % — SIGNIFICANT CHANGE UP (ref 32–36)
MCV RBC AUTO: 87.2 FL — SIGNIFICANT CHANGE UP (ref 80–100)
NRBC # FLD: 0 K/UL — SIGNIFICANT CHANGE UP (ref 0–0)
PLATELET # BLD AUTO: 214 K/UL — SIGNIFICANT CHANGE UP (ref 150–400)
PMV BLD: 11.6 FL — SIGNIFICANT CHANGE UP (ref 7–13)
RBC # BLD: 5.06 M/UL — SIGNIFICANT CHANGE UP (ref 4.2–5.8)
RBC # FLD: 13.2 % — SIGNIFICANT CHANGE UP (ref 10.3–14.5)
WBC # BLD: 5.52 K/UL — SIGNIFICANT CHANGE UP (ref 3.8–10.5)
WBC # FLD AUTO: 5.52 K/UL — SIGNIFICANT CHANGE UP (ref 3.8–10.5)

## 2019-05-28 ENCOUNTER — TRANSCRIPTION ENCOUNTER (OUTPATIENT)
Age: 49
End: 2019-05-28

## 2019-05-29 ENCOUNTER — MEDICATION RENEWAL (OUTPATIENT)
Age: 49
End: 2019-05-29

## 2019-05-29 ENCOUNTER — RESULT REVIEW (OUTPATIENT)
Age: 49
End: 2019-05-29

## 2019-05-29 ENCOUNTER — APPOINTMENT (OUTPATIENT)
Dept: OTOLARYNGOLOGY | Facility: AMBULATORY SURGERY CENTER | Age: 49
End: 2019-05-29

## 2019-05-29 ENCOUNTER — OUTPATIENT (OUTPATIENT)
Dept: OUTPATIENT SERVICES | Facility: HOSPITAL | Age: 49
LOS: 1 days | Discharge: HOME | End: 2019-05-29
Payer: COMMERCIAL

## 2019-05-29 VITALS
WEIGHT: 279.99 LBS | HEART RATE: 59 BPM | TEMPERATURE: 98 F | HEIGHT: 73 IN | OXYGEN SATURATION: 97 % | RESPIRATION RATE: 16 BRPM | DIASTOLIC BLOOD PRESSURE: 71 MMHG | SYSTOLIC BLOOD PRESSURE: 134 MMHG

## 2019-05-29 VITALS
OXYGEN SATURATION: 95 % | SYSTOLIC BLOOD PRESSURE: 128 MMHG | HEART RATE: 75 BPM | RESPIRATION RATE: 15 BRPM | DIASTOLIC BLOOD PRESSURE: 80 MMHG

## 2019-05-29 DIAGNOSIS — Z98.1 ARTHRODESIS STATUS: Chronic | ICD-10-CM

## 2019-05-29 DIAGNOSIS — J34.2 DEVIATED NASAL SEPTUM: ICD-10-CM

## 2019-05-29 DIAGNOSIS — Z98.890 OTHER SPECIFIED POSTPROCEDURAL STATES: Chronic | ICD-10-CM

## 2019-05-29 DIAGNOSIS — I80.9 PHLEBITIS AND THROMBOPHLEBITIS OF UNSPECIFIED SITE: Chronic | ICD-10-CM

## 2019-05-29 PROCEDURE — 88304 TISSUE EXAM BY PATHOLOGIST: CPT | Mod: 26

## 2019-05-29 PROCEDURE — 30140 RESECT INFERIOR TURBINATE: CPT | Mod: 50

## 2019-05-29 PROCEDURE — 30520 REPAIR OF NASAL SEPTUM: CPT | Mod: 59

## 2019-05-29 PROCEDURE — 88311 DECALCIFY TISSUE: CPT | Mod: 26

## 2019-05-29 RX ORDER — AZITHROMYCIN 500 MG/1
1 TABLET, FILM COATED ORAL
Qty: 6 | Refills: 0
Start: 2019-05-29 | End: 2019-06-02

## 2019-05-29 RX ORDER — ACETAMINOPHEN WITH CODEINE 300MG-30MG
1 TABLET ORAL
Qty: 20 | Refills: 0
Start: 2019-05-29 | End: 2019-06-02

## 2019-05-29 NOTE — ASU DISCHARGE PLAN (ADULT/PEDIATRIC) - CALL YOUR DOCTOR IF YOU HAVE ANY OF THE FOLLOWING:
Excessive diarrhea/Nausea and vomiting that does not stop/Bleeding that does not stop Nausea and vomiting that does not stop/Excessive diarrhea/Bleeding that does not stop/Pain not relieved by Medications

## 2019-05-29 NOTE — ASU DISCHARGE PLAN (ADULT/PEDIATRIC) - CARE PROVIDER_API CALL
Raymond Aponte (MD)  Facial Plastic and Reconstructive Surgery; Otolaryngology  [address] [place] [address], [address]  [place] [address]  Phone: [phone]  Fax: [phone]  Follow Up Time:

## 2019-05-30 ENCOUNTER — APPOINTMENT (OUTPATIENT)
Dept: OTOLARYNGOLOGY | Facility: CLINIC | Age: 49
End: 2019-05-30
Payer: COMMERCIAL

## 2019-05-30 ENCOUNTER — APPOINTMENT (OUTPATIENT)
Dept: DERMATOLOGY | Facility: CLINIC | Age: 49
End: 2019-05-30
Payer: COMMERCIAL

## 2019-05-30 VITALS
HEART RATE: 67 BPM | HEIGHT: 74 IN | BODY MASS INDEX: 35.94 KG/M2 | SYSTOLIC BLOOD PRESSURE: 143 MMHG | WEIGHT: 280 LBS | DIASTOLIC BLOOD PRESSURE: 87 MMHG

## 2019-05-30 VITALS — BODY MASS INDEX: 36.19 KG/M2 | HEIGHT: 74 IN | WEIGHT: 282 LBS

## 2019-05-30 DIAGNOSIS — L40.9 PSORIASIS, UNSPECIFIED: ICD-10-CM

## 2019-05-30 DIAGNOSIS — Z77.22 CONTACT WITH AND (SUSPECTED) EXPOSURE TO ENVIRONMENTAL TOBACCO SMOKE (ACUTE) (CHRONIC): ICD-10-CM

## 2019-05-30 DIAGNOSIS — L71.9 ROSACEA, UNSPECIFIED: ICD-10-CM

## 2019-05-30 DIAGNOSIS — L30.9 DERMATITIS, UNSPECIFIED: ICD-10-CM

## 2019-05-30 DIAGNOSIS — Z91.89 OTHER SPECIFIED PERSONAL RISK FACTORS, NOT ELSEWHERE CLASSIFIED: ICD-10-CM

## 2019-05-30 PROCEDURE — 99203 OFFICE O/P NEW LOW 30 MIN: CPT

## 2019-05-30 PROCEDURE — 99024 POSTOP FOLLOW-UP VISIT: CPT

## 2019-05-30 PROCEDURE — 31237 NSL/SINS NDSC SURG BX POLYPC: CPT | Mod: 50,58

## 2019-05-30 NOTE — HISTORY OF PRESENT ILLNESS
[de-identified] : Patient si 1 day s/p septoplasty and is having moderate facial pressure under the eyes bilaterally. He does not have any fevers or chills. He started the antibitoics this morning. He has not been able to breathe through the nose bilaterally as a result of the packing in place. He had to change the tip dressing several times overnight

## 2019-05-30 NOTE — ASSESSMENT
[FreeTextEntry1] : patient follows up 1 day status post septoplasty with turbs Patient started taking the antibiotics as prescribed and is taking pain medication as needed. nasal packing was removed from the bilateral nasal cavities without issue. Patient was debrided bilaterally with rigid suction as a result of nasal crusting. Tip dressing was replaced and should be changed as needed until followup. Patient should continue to avoid nose blowing, heavy lifting or exercising, and should start a regimen of over the counter nasal saline spray for moisturization of the nasal cavities. Patient was advised to continue the antibiotics and to followup on postoperative day 5 for nasal splint removal.

## 2019-06-03 ENCOUNTER — APPOINTMENT (OUTPATIENT)
Dept: OTOLARYNGOLOGY | Facility: CLINIC | Age: 49
End: 2019-06-03
Payer: COMMERCIAL

## 2019-06-03 VITALS
HEIGHT: 74 IN | SYSTOLIC BLOOD PRESSURE: 131 MMHG | BODY MASS INDEX: 36.19 KG/M2 | DIASTOLIC BLOOD PRESSURE: 83 MMHG | WEIGHT: 282 LBS | HEART RATE: 64 BPM

## 2019-06-03 PROBLEM — E78.5 HYPERLIPIDEMIA, UNSPECIFIED: Chronic | Status: ACTIVE | Noted: 2018-03-28

## 2019-06-03 PROBLEM — J34.2 DEVIATED NASAL SEPTUM: Chronic | Status: ACTIVE | Noted: 2019-05-14

## 2019-06-03 LAB — SURGICAL PATHOLOGY STUDY: SIGNIFICANT CHANGE UP

## 2019-06-03 PROCEDURE — 31237 NSL/SINS NDSC SURG BX POLYPC: CPT | Mod: 50,58

## 2019-06-03 PROCEDURE — 99024 POSTOP FOLLOW-UP VISIT: CPT

## 2019-06-03 NOTE — ASSESSMENT
[FreeTextEntry1] : patient follows up 5 days status post septoplasty with turbs. Patient has finished taking the antibiotics as prescribed and is taking pain medication as needed. nasal splints were removed from the bilateral nasal cavities without issue. Patient was debrided bilaterally with rigid suction as a result of nasal crusting. Tip dressing was replaced and should be changed as needed until followup. Patient should continue to avoid nose blowing, heavy lifting or exercising, and should continue a regimen of over the counter nasal saline spray for moisturization of the nasal cavities. Patient will followup in 1 week for further debridement.

## 2019-06-03 NOTE — HISTORY OF PRESENT ILLNESS
[de-identified] : Patient is 5 days s/p septoplasty and is having minor nasal congestion bilaterally witjh some crusting. he has been doing the saline in both sides of the nose and getting out some mucus. He finished the antibitoics today. He has nto had any pain in the face or nose. He admits to moderate nasal obstruction to his breathing bilaterally

## 2019-06-25 ENCOUNTER — APPOINTMENT (OUTPATIENT)
Dept: OTOLARYNGOLOGY | Facility: CLINIC | Age: 49
End: 2019-06-25
Payer: COMMERCIAL

## 2019-06-25 VITALS
SYSTOLIC BLOOD PRESSURE: 102 MMHG | HEIGHT: 74 IN | BODY MASS INDEX: 34.91 KG/M2 | HEART RATE: 58 BPM | DIASTOLIC BLOOD PRESSURE: 73 MMHG | WEIGHT: 272 LBS

## 2019-06-25 PROCEDURE — 99024 POSTOP FOLLOW-UP VISIT: CPT

## 2019-06-25 PROCEDURE — 31237 NSL/SINS NDSC SURG BX POLYPC: CPT | Mod: 50,58

## 2019-06-25 NOTE — ASSESSMENT
[FreeTextEntry1] : Patient status post surgery during our feeling somewhat congested he had some debris in his left nasal cavity which was suctioned out he is otherwise pretty much healed a followup and see us again in a month.

## 2019-06-25 NOTE — HISTORY OF PRESENT ILLNESS
[de-identified] : PAtient is abuot 1 month s/p septoplasty and is still having nasal crusting and congestion. He does not have any pain in thec heeks or in the nose but notes that he still has mucus. He  still has issues breathing through the nose that comes and goes. He does not have any current throat pain or issues swallowing. Huseyin has been doing the saline as well

## 2019-07-01 NOTE — BRIEF OPERATIVE NOTE - OPERATION/FINDINGS
How Severe Is Your Skin Lesion?: mild Have Your Skin Lesions Been Treated?: not been treated Is This A New Presentation, Or A Follow-Up?: Skin Lesions ARTHRITIS

## 2019-08-26 ENCOUNTER — APPOINTMENT (OUTPATIENT)
Dept: OTOLARYNGOLOGY | Facility: CLINIC | Age: 49
End: 2019-08-26
Payer: COMMERCIAL

## 2019-08-26 VITALS
DIASTOLIC BLOOD PRESSURE: 79 MMHG | HEART RATE: 57 BPM | WEIGHT: 268 LBS | BODY MASS INDEX: 34.39 KG/M2 | HEIGHT: 74 IN | SYSTOLIC BLOOD PRESSURE: 128 MMHG

## 2019-08-26 DIAGNOSIS — J34.89 OTHER SPECIFIED DISORDERS OF NOSE AND NASAL SINUSES: ICD-10-CM

## 2019-08-26 DIAGNOSIS — Z98.890 OTHER SPECIFIED POSTPROCEDURAL STATES: ICD-10-CM

## 2019-08-26 DIAGNOSIS — R42 DIZZINESS AND GIDDINESS: ICD-10-CM

## 2019-08-26 DIAGNOSIS — R09.81 NASAL CONGESTION: ICD-10-CM

## 2019-08-26 PROCEDURE — 31237 NSL/SINS NDSC SURG BX POLYPC: CPT | Mod: 50,58

## 2019-08-26 PROCEDURE — 99024 POSTOP FOLLOW-UP VISIT: CPT

## 2019-08-26 NOTE — REVIEW OF SYSTEMS
[Dizziness] : dizziness [Lightheadedness] : lightheadedness [Nasal Congestion] : nasal congestion [Negative] : Heme/Lymph

## 2019-08-26 NOTE — HISTORY OF PRESENT ILLNESS
[de-identified] : Patietn has been having minor nasal congestion and crusting that still accumulates on occasion on both sides of the nose. He is doing nasal saline spray with minor benefit. He does have minor fogginess that comes and goes throughout the day but when he exercises it is better.  He does occasionally get the sensation of the room spinning and feels weak and wobbly. He does not have any ringing in the ears or dizziness. he has not had any recent nosebleeds

## 2019-08-26 NOTE — ASSESSMENT
[FreeTextEntry1] : Patient is now 3 months status post surgery is doing really well her breathing fine on medication he does get some mild congestion of his left nasal cavity it seems to be some dry debris which was removed on the postpartum nasal gel to help keep his nose moist he'll also continues to have symptoms consistent with benign positional vertigo he noted that his exercises we once again gave him some vestibular rehabilitation to perform it is no significant improvement a formal neurologic evaluation will be considered.

## 2020-04-13 NOTE — ASU PATIENT PROFILE, ADULT - MEDICATIONS BROUGHT TO HOSPITAL, PROFILE
Additional testing or clinical evaluation not possible until after Covid19 lockdown    Disability paperwork received and completed as well as possible given the circumstances   no

## 2020-06-16 NOTE — H&P PST ADULT - NEGATIVE GENERAL GENITOURINARY SYMPTOMS
Nathalie Siddiqui(PA) no hematuria/no dysuria/no incontinence/no flank pain R/no flank pain L/no bladder infections

## 2020-09-15 ENCOUNTER — APPOINTMENT (OUTPATIENT)
Dept: ORTHOPEDIC SURGERY | Facility: CLINIC | Age: 50
End: 2020-09-15
Payer: COMMERCIAL

## 2020-09-15 VITALS
HEART RATE: 62 BPM | BODY MASS INDEX: 34.27 KG/M2 | HEIGHT: 74 IN | SYSTOLIC BLOOD PRESSURE: 129 MMHG | WEIGHT: 267 LBS | DIASTOLIC BLOOD PRESSURE: 79 MMHG

## 2020-09-15 DIAGNOSIS — M77.11 LATERAL EPICONDYLITIS, RIGHT ELBOW: ICD-10-CM

## 2020-09-15 PROCEDURE — 99204 OFFICE O/P NEW MOD 45 MIN: CPT

## 2020-09-16 NOTE — HISTORY OF PRESENT ILLNESS
[de-identified] : RHD Patient is here for right elbow pain that began about 3 weeks ago without inciting injury. He has been throwing recently but does not recall anything. The pain is localized to the lateral aspect of the elbow. He had his son massage the area. He has done nothing to treat this pain. Pain is worse with pushing and making a fist with his elbow extended. Rest improves the pain.  He works as a . He is very active. Denies N/T/R/Prior injury. \par \par The patient's past medical history, past surgical history, medications and allergies were reviewed by me today and documented accordingly. In addition, the patient's family and social history, which were noncontributory to this visit, were reviewed also. Intake form was reviewed. The patient has no family history of arthritis.

## 2020-09-16 NOTE — PHYSICAL EXAM
[de-identified] : Constitutional: Well-nourished, well-developed, No acute distress\par Respiratory:  Good respiratory effort, no SOB\par Lymphatic: No regional lymphadenopathy, no lymphedema\par Psychiatric: Pleasant and normal affect, alert and oriented x3\par Skin: Clean dry and intact B/L UE/LE\par Musculoskeletal: normal except where as noted in regional exam\par \par \par Left Elbow:\par APPEARANCE: no marked deformities, no swelling or malalignment\par POSITIVE TENDERNESS: none\par NONTENDER: lateral and medial epicondyles, posterior capsules, and other areas of the elbow. \par ROM: full & painless flext/ext, pronation/supination. \par RESISTIVE TESTING: painless resisted elbow flexion/extension, wrist/long finger extension. painless resisted wrist/long finger flexion. painless resisted supination/pronation. \par SPECIAL TESTS: stable v/v stress. neg tinel's cubital tunnel\par Vasc: 2+ radial pulse\par Neuro: AIN, PIN, Ulnar nerve intact to motor\par Sensation: Intact to light touch throughout\par B/L Shoulders:  No asymmetry, malalignment, or swelling, Full ROM, 5/5 strength in flexion/ext, Abd/Add, IR/ER, Joints stable\par B/L Wrist and Hand:  No asymmetry, malalignment, or swelling, Full ROM, 5/5 strength in wrist and long finger flexion/ext, and radial/ulnar deviation, Joints stable\par \par Right Elbow:\par APPEARANCE: no marked deformities, no swelling or malalignment\par POSITIVE TENDERNESS: + common extensor bundle at the lateral epicondyle\par NONTENDER: medial epicondyle, posterior capsules, and other areas of the elbow. \par ROM: full, mild pain with end range of wrist flexion and forearm pronation,  painless wrist ext / forearm supination. \par RESISTIVE TESTING: resisted wrist/long finger extension reproduces pain at the lateral epicondyle. resisted supination also reproduces some pain. painless resisted wrist flexion. painless resisted pronation. \par SPECIAL TESTS: stable v/v stress. neg tinel's cubital tunnel\par Vasc: 2+ radial pulse\par Neuro: AIN, PIN, Ulnar nerve intact to motor\par Sensation: Intact to light touch throughout\par \par

## 2020-09-16 NOTE — DISCUSSION/SUMMARY
[de-identified] : Discussed findings of today's exam and possible causes of patient's pain. Educated patient on their most probable diagnosis of right lateral epicondylitis. Reviewed possible courses of treatment, and we collaboratively decided best course of treatment at this time will include wearing counterforce brace for next 1-2 weeks during the day. She will purchase Voltaren gel over the counter. She deferred cortisone injection at this time. Follow up as needed. Patient appreciates and agrees with current plan. \par \par This note was generated using dragon medical dictation software. A reasonable effort has been made for proofreading its contents, but typos may still remain. If there are any questions or points of clarification needed please notify my office.

## 2021-02-16 NOTE — ASU PREOP CHECKLIST - WEIGHT IN KG
Thank you Dr. Trini Espinal for such thorough  recommendations. I will be happy to share them with Hernandez Rodriguez on follow up and may refer him to his primary care provider in this regard. I really appreciate your input.      Corwin Spears MD 127

## 2021-03-09 NOTE — DISCHARGE NOTE ADULT - NS TRANSFER PATIENT BELONGINGS
Subjective   Patient ID: Mini is a 22 year old female.    Chief Complaint   Patient presents with   • Annual Exam     Last pap 10/2019, unsure if due, needs refills on BC pills.     HPI     Here for PE.    Last Pap: 10/31/2019- normal  LMP: regular  Pt did get all 3 HPV vaccines.   Pt has become sexually active since last OV.   No STD concerns or sx's. Uses condoms consistently.    Note:  Pt saw hematologist 11/26/2019 because of her mother's hx of DVT. He said she was OK for BCP. Started 2/2020.      Mini has no past medical history on file.  Mini has Annual physical exam; Depression with anxiety; Health maintenance examination; and Encounter for surveillance of contraceptive pills on their problem list.  Mini has a past surgical history that includes Knee surgery.  Her family history includes Cancer in her father and paternal grandfather; Clotting Disorder in her maternal grandfather and mother; Heart disease (age of onset: 75) in her maternal grandmother; Stroke (age of onset: 77) in her paternal grandfather.  Mini reports that she has never smoked. She has never used smokeless tobacco. She reports current alcohol use of about 3.0 standard drinks of alcohol per week. She reports that she does not use drugs.  Mini has a current medication list which includes the following prescription(s): norgestimate-ethinyl estradiol and tobramycin.  Mini has No Known Allergies.    Review of Systems   Constitutional: Negative for activity change, appetite change, chills, diaphoresis, fatigue, fever and unexpected weight change.   HENT: Negative for congestion, dental problem, ear discharge, ear pain, rhinorrhea, sinus pressure, sinus pain, sneezing, sore throat and trouble swallowing.    Eyes: Negative for pain, discharge, redness and visual disturbance.   Respiratory: Negative for cough, chest tightness, shortness of breath and wheezing.    Cardiovascular: Negative for chest pain, palpitations and leg  swelling.   Gastrointestinal: Negative for abdominal pain, blood in stool, constipation, diarrhea, nausea and vomiting.   Endocrine: Negative for cold intolerance, heat intolerance, polydipsia, polyphagia and polyuria.   Genitourinary: Negative for dysuria, flank pain, frequency, hematuria, menstrual problem, pelvic pain, urgency and vaginal discharge.   Musculoskeletal: Negative for arthralgias, back pain, gait problem, joint swelling, neck pain and neck stiffness.   Skin: Negative for color change and rash.   Neurological: Negative for dizziness, tremors, syncope, weakness, light-headedness, numbness and headaches.   Hematological: Negative for adenopathy. Does not bruise/bleed easily.   Psychiatric/Behavioral: Negative for dysphoric mood, sleep disturbance and suicidal ideas. The patient is not nervous/anxious and is not hyperactive.         No longer on sertraline but dong well.       Objective   Physical Exam  Vitals signs and nursing note reviewed.   Constitutional:       General: She is not in acute distress.     Appearance: Normal appearance. She is well-developed.   HENT:      Head: Normocephalic.      Right Ear: Hearing, tympanic membrane, ear canal and external ear normal.      Left Ear: Hearing, tympanic membrane, ear canal and external ear normal.      Nose: Nose normal. No mucosal edema, congestion or rhinorrhea.      Right Sinus: No maxillary sinus tenderness or frontal sinus tenderness.      Left Sinus: No maxillary sinus tenderness or frontal sinus tenderness.      Mouth/Throat:      Mouth: Mucous membranes are moist.      Pharynx: Oropharynx is clear. No posterior oropharyngeal erythema.   Eyes:      General: Lids are normal. No scleral icterus.        Right eye: No discharge.         Left eye: No discharge.      Extraocular Movements: Extraocular movements intact.      Conjunctiva/sclera: Conjunctivae normal.      Pupils: Pupils are equal, round, and reactive to light.   Neck:       Musculoskeletal: Normal range of motion and neck supple. No neck rigidity or muscular tenderness.      Thyroid: No thyroid mass or thyromegaly.      Vascular: Normal carotid pulses.      Trachea: Trachea normal.   Cardiovascular:      Rate and Rhythm: Normal rate and regular rhythm.      Pulses: Normal pulses.      Heart sounds: Normal heart sounds. No murmur. No gallop.    Pulmonary:      Effort: Pulmonary effort is normal.      Breath sounds: Normal breath sounds. No wheezing, rhonchi or rales.   Abdominal:      General: Bowel sounds are normal. There is no distension.      Palpations: Abdomen is soft. There is no mass.      Tenderness: There is no abdominal tenderness. There is no right CVA tenderness, left CVA tenderness, guarding or rebound.      Hernia: No hernia is present.   Musculoskeletal: Normal range of motion.         General: No swelling or tenderness.      Right lower leg: No edema.      Left lower leg: No edema.   Lymphadenopathy:      Cervical: No cervical adenopathy.   Skin:     General: Skin is warm and dry.      Findings: No bruising, erythema or rash.   Neurological:      General: No focal deficit present.      Mental Status: She is alert and oriented to person, place, and time.      Cranial Nerves: No cranial nerve deficit.      Sensory: No sensory deficit.      Motor: No abnormal muscle tone.      Coordination: Coordination normal.      Gait: Gait normal.      Deep Tendon Reflexes: Reflexes normal.   Psychiatric:         Mood and Affect: Mood normal.         Behavior: Behavior normal.         Thought Content: Thought content normal.         Assessment   Problem List Items Addressed This Visit        Other    Health maintenance examination - Primary     Preventive Health Maintenance:   Immunizations recommendations reviewed.   Cancer Screening Recommendations reviewed as appropriate to patient.  Preventative diet & exercise related life-style changes discussed & recommended.    Injury  prevention counseling completed: seatbelts, helmets, smoke detectors,  & sun safety discussed .    Safer sexual practices encouraged, specifically consistent use of condoms.  Have Pap done in 1-2 years.  Adequate calcium and vitamin d intake reviewed and recommended.    Self-breast exam taught & recommended.    Counseled on alcohol usage & associated health risks & benefits.    Proper usage and side effects of medications reviewed & discussed.    Take medication as directed.    Patient education completed on disease process, etiology & prognosis.    Return to clinic as clinically indicated as discussed with patient who verbalized understanding of & agreement with the plan.    Written handout given.              Relevant Orders    CBC WITH DIFFERENTIAL    COMPREHENSIVE METABOLIC PANEL    LIPID PANEL WITH REFLEX    Encounter for surveillance of contraceptive pills     Doing well.  CPM               Pap smears and HPV testing as according to ACOG guidelines.  Continue with yearly breast and pelvic exams.    Health Maintenance Summary     Influenza Vaccine (1)  Overdue - never done    Cervical Cancer Screening (Every 3 Years)  Next due on 10/31/2022    DTaP/Tdap/Td Vaccine (8 - Td)  Next due on 4/1/2029    Hepatitis B Vaccine   Completed    Varicella Vaccine   Completed    HPV Vaccine   Completed    Meningococcal Vaccine   Completed    Pneumococcal Vaccine 0-64   Aged Out          Schedule follow up: in a year, at next annual exam   Clothing

## 2021-06-12 NOTE — OCCUPATIONAL THERAPY INITIAL EVALUATION ADULT - PERTINENT HX OF CURRENT PROBLEM, REHAB EVAL
Pt is a 47 year old male with PMHx of hypertension.  Pt reports he has been experiencing back pain for the past 6 years, progressively becoming worse. Last MRI done April 2017, showed spondylolisthises and spinal stenosis. Pt is now s/p L3-L5 lumbar laminectomy and fusion with instrumentation on 6/13/17.
No

## 2022-12-29 ENCOUNTER — APPOINTMENT (OUTPATIENT)
Dept: SURGERY | Facility: CLINIC | Age: 52
End: 2022-12-29

## 2022-12-29 VITALS
DIASTOLIC BLOOD PRESSURE: 90 MMHG | SYSTOLIC BLOOD PRESSURE: 155 MMHG | HEART RATE: 71 BPM | WEIGHT: 274 LBS | TEMPERATURE: 96.8 F | HEIGHT: 74 IN | RESPIRATION RATE: 18 BRPM | OXYGEN SATURATION: 98 % | BODY MASS INDEX: 35.16 KG/M2

## 2022-12-29 PROCEDURE — 99204 OFFICE O/P NEW MOD 45 MIN: CPT

## 2022-12-29 RX ORDER — ACETAMINOPHEN 500 MG
TABLET ORAL
Refills: 0 | Status: ACTIVE | COMMUNITY

## 2022-12-29 RX ORDER — ATORVASTATIN CALCIUM 20 MG/1
20 TABLET, FILM COATED ORAL
Refills: 0 | Status: ACTIVE | COMMUNITY

## 2022-12-29 RX ORDER — DICLOFENAC SODIUM 75 MG/1
75 TABLET, DELAYED RELEASE ORAL
Qty: 60 | Refills: 2 | Status: DISCONTINUED | COMMUNITY
Start: 2019-02-25 | End: 2022-12-29

## 2022-12-29 RX ORDER — FLUOCINONIDE 0.5 MG/ML
0.05 SOLUTION TOPICAL
Qty: 1 | Refills: 1 | Status: DISCONTINUED | COMMUNITY
Start: 2019-05-30 | End: 2022-12-29

## 2022-12-29 RX ORDER — OMEGA-3/DHA/EPA/FISH OIL 300-1000MG
CAPSULE ORAL
Refills: 0 | Status: ACTIVE | COMMUNITY

## 2022-12-29 RX ORDER — ENALAPRIL MALEATE 5 MG/1
5 TABLET ORAL
Refills: 0 | Status: DISCONTINUED | COMMUNITY
End: 2022-12-29

## 2022-12-29 RX ORDER — CHROMIUM 200 MCG
TABLET ORAL
Refills: 0 | Status: ACTIVE | COMMUNITY

## 2022-12-29 RX ORDER — ACETAMINOPHEN AND CODEINE 300; 30 MG/1; MG/1
300-30 TABLET ORAL EVERY 6 HOURS
Qty: 20 | Refills: 0 | Status: DISCONTINUED | COMMUNITY
Start: 2019-05-29 | End: 2022-12-29

## 2022-12-29 RX ORDER — FLUTICASONE PROPIONATE 50 UG/1
50 SPRAY, METERED NASAL DAILY
Qty: 1 | Refills: 3 | Status: DISCONTINUED | COMMUNITY
Start: 2018-07-23 | End: 2022-12-29

## 2022-12-29 RX ORDER — SODIUM SULFACETAMIDE 100 MG/G
10 LIQUID TOPICAL
Qty: 1 | Refills: 2 | Status: DISCONTINUED | COMMUNITY
Start: 2019-05-30 | End: 2022-12-29

## 2022-12-29 RX ORDER — AZITHROMYCIN 250 MG/1
250 TABLET, FILM COATED ORAL
Qty: 1 | Refills: 0 | Status: DISCONTINUED | COMMUNITY
Start: 2019-05-29 | End: 2022-12-29

## 2022-12-29 NOTE — HISTORY OF PRESENT ILLNESS
[de-identified] : Thomas is a 52 year old male here for consultation. [de-identified] : This 52-year-old patient is well-known to me since I repaired his right inguinal hernia 4 years ago.  Today he presents with a painful mass above the umbilicus.  The hernia has been present for some time however in the past few weeks he has had moderate pain from it.  The mass does not reduce spontaneously according to the patient.  He is otherwise tolerating regular diet and having normal bowel movements.

## 2022-12-29 NOTE — ASSESSMENT
[FreeTextEntry1] : This patient is suffering from a small though symptomatic umbilical hernia.  I have offered him repair of this fascial defect.  He understands that his diastases may predispose to hernia recurrence.  He is uninterested in fixing the diastases.  The procedure as well as risks(including, but not limited to bleeding, infection, injury to hollow viscus, ), benefits (pain relief), and alternatives were explained to the patient.\par \par Please advise me on the safety of general anesthesia for this pleasant patient

## 2022-12-29 NOTE — CONSULT LETTER
[Dear  ___] : Dear  [unfilled], [Consult Letter:] : I had the pleasure of evaluating your patient, [unfilled]. [Please see my note below.] : Please see my note below. [Consult Closing:] : Thank you very much for allowing me to participate in the care of this patient.  If you have any questions, please do not hesitate to contact me. [Sincerely,] : Sincerely, [FreeTextEntry3] : Charles Valentin MD, FACS\par [facility]\par [facility]\par [address]\par [place]  [address]\par Tel: [phone]\par Email: [email or web address] \par

## 2022-12-29 NOTE — PHYSICAL EXAM
[Normal Breath Sounds] : Normal breath sounds [Normal Heart Sounds] : normal heart sounds [No Rash or Lesion] : No rash or lesion [Alert] : alert [Oriented to Person] : oriented to person [Oriented to Place] : oriented to place [Oriented to Time] : oriented to time [Calm] : calm [JVD] : no jugular venous distention  [de-identified] : Well nurished well developed male [de-identified] : Normal [de-identified] : Supple [de-identified] : Obese soft with moderate tenderness just above the umbilicus.  There is a 2 cm mass just above the umbilicus which is nonreducible and tender.  Diastases recti is present. [de-identified] : Normal

## 2023-01-17 ENCOUNTER — OUTPATIENT (OUTPATIENT)
Dept: OUTPATIENT SERVICES | Facility: HOSPITAL | Age: 53
LOS: 1 days | End: 2023-01-17
Payer: COMMERCIAL

## 2023-01-17 VITALS
RESPIRATION RATE: 16 BRPM | DIASTOLIC BLOOD PRESSURE: 72 MMHG | HEART RATE: 66 BPM | SYSTOLIC BLOOD PRESSURE: 136 MMHG | WEIGHT: 297.18 LBS | TEMPERATURE: 97 F | OXYGEN SATURATION: 97 % | HEIGHT: 73 IN

## 2023-01-17 DIAGNOSIS — K42.9 UMBILICAL HERNIA WITHOUT OBSTRUCTION OR GANGRENE: ICD-10-CM

## 2023-01-17 DIAGNOSIS — Z98.1 ARTHRODESIS STATUS: Chronic | ICD-10-CM

## 2023-01-17 DIAGNOSIS — I10 ESSENTIAL (PRIMARY) HYPERTENSION: ICD-10-CM

## 2023-01-17 DIAGNOSIS — I80.9 PHLEBITIS AND THROMBOPHLEBITIS OF UNSPECIFIED SITE: Chronic | ICD-10-CM

## 2023-01-17 DIAGNOSIS — Z98.890 OTHER SPECIFIED POSTPROCEDURAL STATES: Chronic | ICD-10-CM

## 2023-01-17 DIAGNOSIS — Z01.818 ENCOUNTER FOR OTHER PREPROCEDURAL EXAMINATION: ICD-10-CM

## 2023-01-17 LAB
ANION GAP SERPL CALC-SCNC: 8 MMOL/L — SIGNIFICANT CHANGE UP (ref 5–17)
BUN SERPL-MCNC: 23 MG/DL — SIGNIFICANT CHANGE UP (ref 7–23)
CALCIUM SERPL-MCNC: 9.6 MG/DL — SIGNIFICANT CHANGE UP (ref 8.4–10.5)
CHLORIDE SERPL-SCNC: 103 MMOL/L — SIGNIFICANT CHANGE UP (ref 96–108)
CO2 SERPL-SCNC: 26 MMOL/L — SIGNIFICANT CHANGE UP (ref 22–31)
CREAT SERPL-MCNC: 0.67 MG/DL — SIGNIFICANT CHANGE UP (ref 0.5–1.3)
EGFR: 111 ML/MIN/1.73M2 — SIGNIFICANT CHANGE UP
GLUCOSE SERPL-MCNC: 87 MG/DL — SIGNIFICANT CHANGE UP (ref 70–99)
HCT VFR BLD CALC: 45.3 % — SIGNIFICANT CHANGE UP (ref 39–50)
HGB BLD-MCNC: 15 G/DL — SIGNIFICANT CHANGE UP (ref 13–17)
MCHC RBC-ENTMCNC: 28.8 PG — SIGNIFICANT CHANGE UP (ref 27–34)
MCHC RBC-ENTMCNC: 33.1 GM/DL — SIGNIFICANT CHANGE UP (ref 32–36)
MCV RBC AUTO: 87.1 FL — SIGNIFICANT CHANGE UP (ref 80–100)
NRBC # BLD: 0 /100 WBCS — SIGNIFICANT CHANGE UP (ref 0–0)
PLATELET # BLD AUTO: 171 K/UL — SIGNIFICANT CHANGE UP (ref 150–400)
POTASSIUM SERPL-MCNC: 4.3 MMOL/L — SIGNIFICANT CHANGE UP (ref 3.5–5.3)
POTASSIUM SERPL-SCNC: 4.3 MMOL/L — SIGNIFICANT CHANGE UP (ref 3.5–5.3)
RBC # BLD: 5.2 M/UL — SIGNIFICANT CHANGE UP (ref 4.2–5.8)
RBC # FLD: 13.2 % — SIGNIFICANT CHANGE UP (ref 10.3–14.5)
SARS-COV-2 RNA SPEC QL NAA+PROBE: SIGNIFICANT CHANGE UP
SODIUM SERPL-SCNC: 137 MMOL/L — SIGNIFICANT CHANGE UP (ref 135–145)
WBC # BLD: 5.65 K/UL — SIGNIFICANT CHANGE UP (ref 3.8–10.5)
WBC # FLD AUTO: 5.65 K/UL — SIGNIFICANT CHANGE UP (ref 3.8–10.5)

## 2023-01-17 PROCEDURE — G0463: CPT

## 2023-01-17 PROCEDURE — 85027 COMPLETE CBC AUTOMATED: CPT

## 2023-01-17 PROCEDURE — 87635 SARS-COV-2 COVID-19 AMP PRB: CPT

## 2023-01-17 PROCEDURE — 80048 BASIC METABOLIC PNL TOTAL CA: CPT

## 2023-01-17 PROCEDURE — 36415 COLL VENOUS BLD VENIPUNCTURE: CPT

## 2023-01-17 PROCEDURE — 93010 ELECTROCARDIOGRAM REPORT: CPT | Mod: NC

## 2023-01-17 PROCEDURE — 93005 ELECTROCARDIOGRAM TRACING: CPT

## 2023-01-17 NOTE — H&P PST ADULT - NSICDXPASTMEDICALHX_GEN_ALL_CORE_FT
PAST MEDICAL HISTORY:  Deviated septum     GERD (gastroesophageal reflux disease)     Hyperlipidemia     Hypertension     Inguinal hernia, bilateral     Obesity (BMI 30-39.9)     Palpitations dx. 2015 on metoprolol    Phlebitis right lower extremity - age 10    Spinal stenosis     Umbilical hernia

## 2023-01-17 NOTE — H&P PST ADULT - HISTORY OF PRESENT ILLNESS
This is a 51 y/o male with PMHX of HTN and HLD, presents to PST with pre-operative diagnosis of umbilical hernia without obstruction or gangrene.  Pt reports history of umbilical hernia x 6 yrs but reports recent worsening.  Able to reduce hernia. Denies any changes in bowel habits. Otherwise patient feels well and denies any acute symptoms.

## 2023-01-19 ENCOUNTER — TRANSCRIPTION ENCOUNTER (OUTPATIENT)
Age: 53
End: 2023-01-19

## 2023-01-20 ENCOUNTER — OUTPATIENT (OUTPATIENT)
Dept: OUTPATIENT SERVICES | Facility: HOSPITAL | Age: 53
LOS: 1 days | End: 2023-01-20
Payer: COMMERCIAL

## 2023-01-20 ENCOUNTER — TRANSCRIPTION ENCOUNTER (OUTPATIENT)
Age: 53
End: 2023-01-20

## 2023-01-20 ENCOUNTER — RESULT REVIEW (OUTPATIENT)
Age: 53
End: 2023-01-20

## 2023-01-20 ENCOUNTER — APPOINTMENT (OUTPATIENT)
Dept: SURGERY | Facility: HOSPITAL | Age: 53
End: 2023-01-20
Payer: COMMERCIAL

## 2023-01-20 VITALS
HEART RATE: 67 BPM | TEMPERATURE: 98 F | SYSTOLIC BLOOD PRESSURE: 163 MMHG | HEIGHT: 73 IN | RESPIRATION RATE: 19 BRPM | WEIGHT: 297.18 LBS | DIASTOLIC BLOOD PRESSURE: 88 MMHG

## 2023-01-20 VITALS
DIASTOLIC BLOOD PRESSURE: 79 MMHG | SYSTOLIC BLOOD PRESSURE: 128 MMHG | RESPIRATION RATE: 16 BRPM | HEART RATE: 71 BPM | TEMPERATURE: 98 F | OXYGEN SATURATION: 93 %

## 2023-01-20 DIAGNOSIS — I80.9 PHLEBITIS AND THROMBOPHLEBITIS OF UNSPECIFIED SITE: Chronic | ICD-10-CM

## 2023-01-20 DIAGNOSIS — Z98.890 OTHER SPECIFIED POSTPROCEDURAL STATES: Chronic | ICD-10-CM

## 2023-01-20 DIAGNOSIS — K42.9 UMBILICAL HERNIA WITHOUT OBSTRUCTION OR GANGRENE: ICD-10-CM

## 2023-01-20 DIAGNOSIS — Z98.1 ARTHRODESIS STATUS: Chronic | ICD-10-CM

## 2023-01-20 PROCEDURE — 49592 RPR AA HRN 1ST < 3 NCR/STRN: CPT

## 2023-01-20 PROCEDURE — 88302 TISSUE EXAM BY PATHOLOGIST: CPT

## 2023-01-20 PROCEDURE — 88302 TISSUE EXAM BY PATHOLOGIST: CPT | Mod: 26

## 2023-01-20 PROCEDURE — 49591 RPR AA HRN 1ST < 3 CM RDC: CPT

## 2023-01-20 RX ORDER — OXYCODONE HYDROCHLORIDE 5 MG/1
5 TABLET ORAL ONCE
Refills: 0 | Status: DISCONTINUED | OUTPATIENT
Start: 2023-01-20 | End: 2023-01-20

## 2023-01-20 RX ORDER — CHOLECALCIFEROL (VITAMIN D3) 125 MCG
1 CAPSULE ORAL
Qty: 0 | Refills: 0 | DISCHARGE

## 2023-01-20 RX ORDER — ATORVASTATIN CALCIUM 80 MG/1
1 TABLET, FILM COATED ORAL
Qty: 0 | Refills: 0 | DISCHARGE

## 2023-01-20 RX ORDER — OXYCODONE HYDROCHLORIDE 5 MG/1
1 TABLET ORAL
Qty: 6 | Refills: 0
Start: 2023-01-20

## 2023-01-20 RX ORDER — OMEGA-3 ACID ETHYL ESTERS 1 G
1 CAPSULE ORAL
Qty: 0 | Refills: 0 | DISCHARGE

## 2023-01-20 RX ORDER — SODIUM CHLORIDE 9 MG/ML
1000 INJECTION, SOLUTION INTRAVENOUS
Refills: 0 | Status: DISCONTINUED | OUTPATIENT
Start: 2023-01-20 | End: 2023-01-20

## 2023-01-20 RX ORDER — OLMESARTAN MEDOXOMIL 5 MG/1
1 TABLET, FILM COATED ORAL
Qty: 0 | Refills: 0 | DISCHARGE

## 2023-01-20 RX ORDER — ONDANSETRON 8 MG/1
4 TABLET, FILM COATED ORAL ONCE
Refills: 0 | Status: DISCONTINUED | OUTPATIENT
Start: 2023-01-20 | End: 2023-01-20

## 2023-01-20 RX ORDER — METOPROLOL TARTRATE 50 MG
1 TABLET ORAL
Qty: 0 | Refills: 0 | DISCHARGE

## 2023-01-20 RX ORDER — HYDROMORPHONE HYDROCHLORIDE 2 MG/ML
1 INJECTION INTRAMUSCULAR; INTRAVENOUS; SUBCUTANEOUS
Refills: 0 | Status: DISCONTINUED | OUTPATIENT
Start: 2023-01-20 | End: 2023-01-20

## 2023-01-20 RX ORDER — HYDROMORPHONE HYDROCHLORIDE 2 MG/ML
0.5 INJECTION INTRAMUSCULAR; INTRAVENOUS; SUBCUTANEOUS
Refills: 0 | Status: DISCONTINUED | OUTPATIENT
Start: 2023-01-20 | End: 2023-01-20

## 2023-01-20 RX ADMIN — SODIUM CHLORIDE 50 MILLILITER(S): 9 INJECTION, SOLUTION INTRAVENOUS at 10:58

## 2023-01-20 NOTE — ASU DISCHARGE PLAN (ADULT/PEDIATRIC) - PATIENT EDUCATION MATERIALS PROVIED
umbilical hernia repair/Pre-printed instructions given for other (specify) oxycodone, tylenol/Pre-printed instructions given for other (specify)

## 2023-01-20 NOTE — ASU DISCHARGE PLAN (ADULT/PEDIATRIC) - ASU DC SPECIAL INSTRUCTIONSFT
Post-operative Instructions    	Wound dressing- There is a dressing on the wound, which consists of white surgical tapes (called steri strips) which are directly adherent to the skin.  These should remain on the skin, and will peel off naturally.  All of the stitches are “internal” and will dissolve naturally.    	Showering/Bathing- You may shower over the dressing the very next day after surgery.  Allow the water to run over the dressing, but do not scrub the area.  Do not sit in a bathtub or swimming pool for at least one week after surgery.    	Activity level- You may resume most normal daily activity as tolerated, but avoid strenuous activities such as aerobics, jogging, exercising or heavy lifting for at least 1 week after surgery.  You may return to work in 1-2 days after surgery.  You may drive as long as you are not taking any prescription pain medication.    	Pain Medication- Please take tylenol every 6 hours, and stagger with ibuprofen every 6 hours that you take in a pain medication every 3 hours. This will allow you to alternate medications for more consistent pain control. For example: take a dose of tylenol at 8 am, then take a dose of ibuprofen at 11 am, then take a dose of tylenol at 2pm, and continue as needed. You may take the prescribed pain medication for any break through pain as needed.    	Follow-up Appointment- Please call the office to schedule your post-operative appointment which should be approximately 10 days after your surgery.     	Bruising/Bleeding/Swelling- It is normal for there to be some bruising and swelling at the site, and there may be some staining of blood on to the dressing.  Some discomfort at the surgical site is expected.  If your symptoms seem excessive, or if you have any question or concerns, please call the office.      Anesthesia Precautions:  For the next 12 hours do not:   •	drive a car,  •	drink alcohol, beer, or wine,   •	make important personal or business decisions  Diet:   •	Progress diet slowly unless otherwise indicated    Physician Notification  •	Any Prescription issues  •	Bleeding that does not stop  •	Persistent nausea and vomiting  •	Pain not relieved by medications  •	Fever greater than 101®F  •	Inability to tolerate liquids or foods  •	Unable to urinate after 8 hours      Follow Up Care:  •	In the event that you develop a complication and you are unable to reach your own physician, you may contact:  [phone] or go to the nearest Emergency Room.   •	Please call your surgeon to schedule your follow up appointment Post-operative Instructions    	Wound dressing- There is a dressing on the wound, which consists of 2 layers.  The outer layer is a clear plastic dressing (called Tegaderm) which is waterproof.  This should remain in place for 2 days post-operatively.  On the 2nd post-operative day, you should remove the clear plastic Tegaderm dressing. Underneath the Tegaderm dressing, there are 2 cotton balls which you may remove at the same time as the Tegaderm. The inner layer of the dressing consists of white surgical tapes (called steri strips) which are directly adherent to the skin.  These should remain on the skin, and will peel off naturally.  All of the stitches are “internal” and will dissolve naturally.    	Showering/Bathing- You may shower over the dressing the very next day after surgery.  Allow the water to run over the dressing, but do not scrub the area.  Do not sit in a bathtub or swimming pool for at least one week after surgery.    	Activity level- You may resume most normal daily activity as tolerated, but avoid strenuous activities such as aerobics, jogging, exercising or heavy lifting for at least 1 week after surgery.  You may return to work in 1-2 days after surgery.  You may drive as long as you are not taking any prescription pain medication.    	Pain Medication- Please take tylenol every 6 hours, and stagger with ibuprofen every 6 hours that you take in a pain medication every 3 hours. This will allow you to alternate medications for more consistent pain control. For example: take a dose of tylenol at 8 am, then take a dose of ibuprofen at 11 am, then take a dose of tylenol at 2pm, and continue as needed. You may take the prescribed pain medication for any break through pain as needed.    	Follow-up Appointment- Please call the office to schedule your post-operative appointment which should be approximately 14 days after your surgery.     	Bruising/Bleeding/Swelling- It is normal for there to be some bruising and swelling at the site, and there may be some staining of blood on to the dressing.  Some discomfort at the surgical site is expected.  If your symptoms seem excessive, or if you have any question or concerns, please call the office.      Anesthesia Precautions:  For the next 12 hours do not:   •	drive a car,  •	drink alcohol, beer, or wine,   •	make important personal or business decisions    Diet:   •	Progress diet slowly unless otherwise indicated    Physician Notification  •	Any Prescription issues  •	Bleeding that does not stop  •	Persistent nausea and vomiting  •	Pain not relieved by medications  •	Fever greater than 101®F  •	Inability to tolerate liquids or foods  •	Unable to urinate after 8 hours      Follow Up Care:  •	In the event that you develop a complication and you are unable to reach your own physician, you may contact:  [phone] or go to the nearest Emergency Room.   •	Please call your surgeon to schedule your follow up appointment

## 2023-01-20 NOTE — ASU DISCHARGE PLAN (ADULT/PEDIATRIC) - CARE PROVIDER_API CALL
Charles Warren)  Surgery  [address], [address]  [place] [address]  Phone: [phone]  Fax: [phone]  Follow Up Time: 2 weeks

## 2023-01-20 NOTE — ASU PATIENT PROFILE, ADULT - FALL HARM RISK - UNIVERSAL INTERVENTIONS
Bed in lowest position, wheels locked, appropriate side rails in place/Call bell, personal items and telephone in reach/Instruct patient to call for assistance before getting out of bed or chair/Non-slip footwear when patient is out of bed/[place] to call system/Physically safe environment - no spills, clutter or unnecessary equipment/Purposeful Proactive Rounding/Room/bathroom lighting operational, light cord in reach

## 2023-01-20 NOTE — ASU PREOP CHECKLIST - INTERNAL PROSTHESES

## 2023-01-23 PROBLEM — K42.9 UMBILICAL HERNIA WITHOUT OBSTRUCTION OR GANGRENE: Chronic | Status: ACTIVE | Noted: 2023-01-17

## 2023-01-27 LAB — SURGICAL PATHOLOGY STUDY: SIGNIFICANT CHANGE UP

## 2023-02-01 ENCOUNTER — APPOINTMENT (OUTPATIENT)
Dept: SURGERY | Facility: CLINIC | Age: 53
End: 2023-02-01
Payer: COMMERCIAL

## 2023-02-01 VITALS
HEIGHT: 74 IN | BODY MASS INDEX: 35.16 KG/M2 | OXYGEN SATURATION: 98 % | HEART RATE: 65 BPM | SYSTOLIC BLOOD PRESSURE: 144 MMHG | TEMPERATURE: 97.2 F | DIASTOLIC BLOOD PRESSURE: 82 MMHG | RESPIRATION RATE: 18 BRPM | WEIGHT: 274 LBS

## 2023-02-01 DIAGNOSIS — K42.9 UMBILICAL HERNIA W/OUT OBSTRUCTION OR GANGRENE: ICD-10-CM

## 2023-02-01 PROCEDURE — 99212 OFFICE O/P EST SF 10 MIN: CPT

## 2023-02-01 NOTE — HISTORY OF PRESENT ILLNESS
[de-identified] : Thomas is a 52yr. old male s/p incarcerated umbical hernia repair on 1/20/2023 is here for post op. appointment [de-identified] : This patient has no postoperative complaints

## 2023-09-26 ENCOUNTER — APPOINTMENT (OUTPATIENT)
Dept: OTOLARYNGOLOGY | Facility: CLINIC | Age: 53
End: 2023-09-26

## 2023-10-11 ENCOUNTER — APPOINTMENT (OUTPATIENT)
Dept: OTOLARYNGOLOGY | Facility: CLINIC | Age: 53
End: 2023-10-11
Payer: COMMERCIAL

## 2023-10-11 VITALS — HEIGHT: 74 IN | BODY MASS INDEX: 33.37 KG/M2 | WEIGHT: 260 LBS

## 2023-10-11 DIAGNOSIS — H90.3 SENSORINEURAL HEARING LOSS, BILATERAL: ICD-10-CM

## 2023-10-11 DIAGNOSIS — R07.0 PAIN IN THROAT: ICD-10-CM

## 2023-10-11 DIAGNOSIS — J38.7 OTHER DISEASES OF LARYNX: ICD-10-CM

## 2023-10-11 DIAGNOSIS — R42 DIZZINESS AND GIDDINESS: ICD-10-CM

## 2023-10-11 DIAGNOSIS — H90.42 SENSORINEURAL HEARING LOSS, UNILATERAL, LEFT EAR, WITH UNRESTRICTED HEARING ON THE CONTRALATERAL SIDE: ICD-10-CM

## 2023-10-11 PROCEDURE — 92557 COMPREHENSIVE HEARING TEST: CPT

## 2023-10-11 PROCEDURE — 92567 TYMPANOMETRY: CPT

## 2023-10-11 PROCEDURE — 31575 DIAGNOSTIC LARYNGOSCOPY: CPT

## 2023-10-11 PROCEDURE — 99204 OFFICE O/P NEW MOD 45 MIN: CPT | Mod: 25

## 2023-10-12 ENCOUNTER — NON-APPOINTMENT (OUTPATIENT)
Age: 53
End: 2023-10-12

## 2023-10-18 ENCOUNTER — APPOINTMENT (OUTPATIENT)
Dept: OTOLARYNGOLOGY | Facility: CLINIC | Age: 53
End: 2023-10-18
Payer: COMMERCIAL

## 2023-10-18 ENCOUNTER — NON-APPOINTMENT (OUTPATIENT)
Age: 53
End: 2023-10-18

## 2023-10-18 PROCEDURE — 92567 TYMPANOMETRY: CPT

## 2023-10-18 PROCEDURE — 92540 BASIC VESTIBULAR EVALUATION: CPT

## 2023-10-18 PROCEDURE — 92537 CALORIC VSTBLR TEST W/REC: CPT

## 2023-10-26 ENCOUNTER — APPOINTMENT (OUTPATIENT)
Dept: MRI IMAGING | Facility: CLINIC | Age: 53
End: 2023-10-26
Payer: COMMERCIAL

## 2023-10-26 PROCEDURE — A9585: CPT

## 2023-10-26 PROCEDURE — 70553 MRI BRAIN STEM W/O & W/DYE: CPT

## 2023-10-27 ENCOUNTER — NON-APPOINTMENT (OUTPATIENT)
Age: 53
End: 2023-10-27

## 2023-11-22 NOTE — H&P PST ADULT - VENOUS THROMBOEMBOLISM AGE
Interventional Cardiology Post-Procedure Note    Chief complaint: Post Cardiac Arrest    HPI:   Bob Edwards is a 59 y.o. 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 He was admitted to ED [facility] on 11/21/2023 status post witnessed cardiac arrest. According to his wife Michelle, he was playing with his dog and his wife heard him fainting. She detected absence of pulse and immediately started CPR for ~3-5 min, and the patient got ROSC. Upon squad arrival, patient had recovered his consciousness, a little confused, but hemodynamically stable. Notably, patient denied any chest pain. EKG showing sinus tachycardia and no ST-T elevations, but non-specific ST-T depressions in hafsa-lateral wall. Patient was sent to ED and later to cath lab to rule out ischemia.    Impressions    # S/P Cardiac Arrest / Multivessel CAD  Patient underwent Right Heart Catheterization (RIJV) with [place]-Angela placement and Left Heart Catheterization (R radial), showing Left Coronary System Dominance (LCx), patent coronaries and patent stents to LAD and OM1. Preserved systolic LV function ~50%. Low right and left chamber filling pressures consistent with dehydration. Preserved cardiac output. Patient remained stable throughout he procedure. Final /60mmHg.    - Patient loaded with ASA and Brilinta prior to the procedure.  - At this point, would suggest to keep GDMT.   - Patient's family wants him to be transferred to [employer], once they work there.    # Hypokalemia  - Chronic low potassium; today 2.8  - Replete potassium    # Hypotension  - Patient clinically dehydrated  -  Would suggest for hydration.    Medications: No current outpatient medications      Allergies:   Allergies  Allergen Reactions   Penicillins Unknown        Physical Exam:  General: alert, confused, in no acute distress  HEENT: NC/AT; EOMI; PERRLA, external ear is normal  Neck: supple; trachea midline; no masses; no JVD  Chest: lungs clear to auscultation bilaterally; no wheezing  CVS: regular rhythm, S1S2 normal, no murmurs  Abdomen: Soft, non-tender, non-distension, no organomegaly  Extremities: no clubbing/cyanosis/edema. Cutaneous lesions LE.  Neuro: Grossly intact     Psychiatric: Confused    Last Recorded Vitals:  There were no vitals filed for this visit.    Last Labs:  Admission on 11/21/2023  Component Date Value   POCT Glucose 11/21/2023 214 (H)    POCT pH, Arterial 11/21/2023 7.54 (H)    POCT pCO2, Arterial 11/21/2023 31 (L)    POCT pO2, Arterial 11/21/2023 137 (H)    POCT SO2, Arterial 11/21/2023 100    POCT Oxy Hemoglobin, Art* 11/21/2023 97.4    POCT Hematocrit Calculat* 11/21/2023 23.0 (L)    POCT Sodium, Arterial 11/21/2023 130 (L)    POCT Potassium, Arterial 11/21/2023 2.8 (LL)    POCT Chloride, Arterial 11/21/2023 99    POCT Ionized Calcium, Ar* 11/21/2023 1.11    POCT Glucose, Arterial 11/21/2023 227 (H)    POCT Lactate, Arterial 11/21/2023 3.9 (H)    POCT Base Excess, Arteri* 11/21/2023 3.8 (H)    POCT HCO3 Calculated, Ar* 11/21/2023 26.5 (H)    POCT Hemoglobin, Arterial 11/21/2023 7.6 (L)    POCT Anion Gap, Arterial 11/21/2023 7 (L)    Patient Temperature 11/21/2023     FiO2 11/21/2023 21    Apparatus 11/21/2023 CANNULA    Flow 11/21/2023 6.0    Critical Called By 11/21/2023 GEOFF HE RRT    Critical Called To 11/21/2023 DR. RUBALCAVA    Critical Call Time 11/21/2023 2208.0000    Critical Read Back 11/21/2023 Y    WBC 11/21/2023 9.1    nRBC 11/21/2023 0.0    RBC 11/21/2023 3.91 (L)    Hemoglobin 11/21/2023 7.8 (L)    Hematocrit 11/21/2023 27.9 (L)    MCV 11/21/2023 71 (L)    MCH 11/21/2023 19.9  (L)    MCHC 11/21/2023 28.0 (L)    RDW 11/21/2023 18.5 (H)    Platelets 11/21/2023 198    Neutrophils % 11/21/2023 57.3    Immature Granulocytes %,* 11/21/2023 0.9    Lymphocytes % 11/21/2023 26.7    Monocytes % 11/21/2023 14.0    Eosinophils % 11/21/2023 0.7    Basophils % 11/21/2023 0.4    Neutrophils Absolute 11/21/2023 5.21    Immature Granulocytes Ab* 11/21/2023 0.08    Lymphocytes Absolute 11/21/2023 2.42    Monocytes Absolute 11/21/2023 1.27 (H)    Eosinophils Absolute 11/21/2023 0.06    Basophils Absolute 11/21/2023 0.04    Glucose 11/21/2023 208 (H)    Sodium 11/21/2023 134 (L)    Potassium 11/21/2023 2.8 (LL)    Chloride 11/21/2023 95 (L)    Bicarbonate 11/21/2023 27    Anion Gap 11/21/2023 15    Urea Nitrogen 11/21/2023 30 (H)    Creatinine 11/21/2023 2.19 (H)    eGFR 11/21/2023 33 (L)    Calcium 11/21/2023 8.7    Albumin 11/21/2023 3.3 (L)    Alkaline Phosphatase 11/21/2023 115    Total Protein 11/21/2023 6.2 (L)    AST 11/21/2023 61 (H)    Bilirubin, Total 11/21/2023 1.0    ALT 11/21/2023 42    Magnesium 11/21/2023 2.37    Protime 11/21/2023 12.6    INR 11/21/2023 1.1    Troponin I, High Sensiti* 11/21/2023 59 (HH)    POCT pH, Venous 11/21/2023 7.41    POCT pCO2, Venous 11/21/2023 44    POCT pO2, Venous 11/21/2023 44    POCT SO2, Venous 11/21/2023 66    POCT Oxy Hemoglobin, Donovan* 11/21/2023 63.7    POCT Base Excess, Venous 11/21/2023 2.7    POCT HCO3 Calculated, Ve* 11/21/2023 27.9 (H)    Patient Temperature 11/21/2023     Test Comment 11/21/2023 _[id number]    (1) 41-60 years

## 2024-01-22 ENCOUNTER — NON-APPOINTMENT (OUTPATIENT)
Age: 54
End: 2024-01-22

## 2024-01-23 ENCOUNTER — APPOINTMENT (OUTPATIENT)
Dept: INTERNAL MEDICINE | Facility: CLINIC | Age: 54
End: 2024-01-23
Payer: COMMERCIAL

## 2024-01-23 VITALS
TEMPERATURE: 98.1 F | SYSTOLIC BLOOD PRESSURE: 144 MMHG | OXYGEN SATURATION: 98 % | HEIGHT: 74 IN | WEIGHT: 257 LBS | RESPIRATION RATE: 16 BRPM | BODY MASS INDEX: 32.98 KG/M2 | HEART RATE: 63 BPM | DIASTOLIC BLOOD PRESSURE: 74 MMHG

## 2024-01-23 DIAGNOSIS — H81.10 BENIGN PAROXYSMAL VERTIGO, UNSPECIFIED EAR: ICD-10-CM

## 2024-01-23 DIAGNOSIS — Z12.2 ENCOUNTER FOR SCREENING FOR MALIGNANT NEOPLASM OF RESPIRATORY ORGANS: ICD-10-CM

## 2024-01-23 DIAGNOSIS — Z87.891 PERSONAL HISTORY OF NICOTINE DEPENDENCE: ICD-10-CM

## 2024-01-23 PROCEDURE — G0296 VISIT TO DETERM LDCT ELIG: CPT

## 2024-01-23 PROCEDURE — 99204 OFFICE O/P NEW MOD 45 MIN: CPT | Mod: 25

## 2024-01-23 RX ORDER — MECLIZINE HYDROCHLORIDE 25 MG/1
25 TABLET ORAL 3 TIMES DAILY
Qty: 45 | Refills: 0 | Status: ACTIVE | COMMUNITY
Start: 2024-01-23 | End: 1900-01-01

## 2024-01-23 RX ORDER — OLMESARTAN MEDOXOMIL 20 MG/1
20 TABLET, FILM COATED ORAL DAILY
Qty: 45 | Refills: 1 | Status: ACTIVE | COMMUNITY

## 2024-01-23 NOTE — REVIEW OF SYSTEMS
[Fever] : no fever [Chills] : no chills [Night Sweats] : no night sweats [Chest Pain] : no chest pain [Palpitations] : no palpitations [Lower Ext Edema] : no lower extremity edema [Shortness Of Breath] : no shortness of breath [Wheezing] : no wheezing [Dyspnea on Exertion] : not dyspnea on exertion [Cough] : no cough [Abdominal Pain] : no abdominal pain [Nausea] : no nausea [Constipation] : no constipation [Diarrhea] : no diarrhea [Vomiting] : no vomiting [Dysuria] : no dysuria [Headache] : no headache [Unsteady Walk] : no ataxia

## 2024-01-23 NOTE — PHYSICAL EXAM
[No Acute Distress] : no acute distress [Normal Sclera/Conjunctiva] : normal sclera/conjunctiva [PERRL] : pupils equal round and reactive to light [EOMI] : extraocular movements intact [Normal Oropharynx] : the oropharynx was normal [Normal TMs] : both tympanic membranes were normal [No Lymphadenopathy] : no lymphadenopathy [Supple] : supple [No Respiratory Distress] : no respiratory distress  [No Accessory Muscle Use] : no accessory muscle use [Clear to Auscultation] : lungs were clear to auscultation bilaterally [Normal Rate] : normal rate  [Normal S1, S2] : normal S1 and S2 [Regular Rhythm] : with a regular rhythm [No Murmur] : no murmur heard [Soft] : abdomen soft [Non-distended] : non-distended [Non Tender] : non-tender [Normal Bowel Sounds] : normal bowel sounds [Normal Posterior Cervical Nodes] : no posterior cervical lymphadenopathy [Normal Anterior Cervical Nodes] : no anterior cervical lymphadenopathy [No Spinal Tenderness] : no spinal tenderness [Grossly Normal Strength/Tone] : grossly normal strength/tone [Normal Gait] : normal gait [No Focal Deficits] : no focal deficits [Normal Insight/Judgement] : insight and judgment were intact [Normal Affect] : the affect was normal

## 2024-01-23 NOTE — HISTORY OF PRESENT ILLNESS
[Initial Evaluation] : an initial evaluation of [Sensation of Movement] : sensation of movement [Spinning Sensation] : spinning sensation [Loss of Balance] : loss of balance [Currently Experiencing] : The patient is currently experiencing symptoms. [Gradual] : gradual [___ Year(s) Ago] : [unfilled] year(s) ago [None] : There is no known event that preceded symptom onset [Daily] : occur daily [Frequent] : frequently [Moderate] : moderate in severity [Worsening] : worsening [Head Movement] : head movement [Ear Pain] : no ear pain [Ear Fullness] : no ear fullness [URI Symptoms] : no upper respiratory symptoms [Syncope] : no syncope [de-identified] : ENT [de-identified] : normal MRI head

## 2024-01-23 NOTE — ASSESSMENT
[FreeTextEntry1] : HCM: Labs reviewed from 3/23, 2022 including CBC, CMP, lipids, A1c, PSA. He will f/u with his cardiologist for bloodwork.  Vertigo: MRI from Dr Alvarez reviewed. Discussed meclizine 25mg TID PRN. Refer to VT.

## 2024-01-23 NOTE — HEALTH RISK ASSESSMENT
[Patient reported colonoscopy was normal] : Patient reported colonoscopy was normal [ColonoscopyDate] : 08/23 [0] : 2) Feeling down, depressed, or hopeless: Not at all (0) [PHQ-2 Negative - No further assessment needed] : PHQ-2 Negative - No further assessment needed [[id number]] : 0 [Former] : Former [20 or more] : 20 or more [< 15 Years] : < 15 Years

## 2024-04-18 ENCOUNTER — APPOINTMENT (OUTPATIENT)
Dept: INTERNAL MEDICINE | Facility: CLINIC | Age: 54
End: 2024-04-18
Payer: COMMERCIAL

## 2024-04-18 VITALS
OXYGEN SATURATION: 98 % | RESPIRATION RATE: 16 BRPM | BODY MASS INDEX: 33.24 KG/M2 | WEIGHT: 259 LBS | SYSTOLIC BLOOD PRESSURE: 132 MMHG | HEIGHT: 74 IN | HEART RATE: 72 BPM | DIASTOLIC BLOOD PRESSURE: 84 MMHG | TEMPERATURE: 98.3 F

## 2024-04-18 DIAGNOSIS — R59.0 LOCALIZED ENLARGED LYMPH NODES: ICD-10-CM

## 2024-04-18 LAB — S PYO AG SPEC QL IA: NEGATIVE

## 2024-04-18 PROCEDURE — G2211 COMPLEX E/M VISIT ADD ON: CPT

## 2024-04-18 PROCEDURE — 99213 OFFICE O/P EST LOW 20 MIN: CPT

## 2024-04-18 PROCEDURE — 87880 STREP A ASSAY W/OPTIC: CPT | Mod: QW

## 2024-04-18 NOTE — REVIEW OF SYSTEMS
[Fever] : no fever [Chills] : no chills [Earache] : no earache [Nasal Discharge] : no nasal discharge [Chest Pain] : no chest pain [Palpitations] : no palpitations [Lower Ext Edema] : no lower extremity edema [Shortness Of Breath] : no shortness of breath [Wheezing] : no wheezing [Cough] : no cough [Dyspnea on Exertion] : not dyspnea on exertion [Abdominal Pain] : no abdominal pain [Nausea] : no nausea [Constipation] : no constipation [Diarrhea] : no diarrhea [Vomiting] : no vomiting [Dysuria] : no dysuria

## 2024-04-18 NOTE — PHYSICAL EXAM
[No Acute Distress] : no acute distress [Normal Sclera/Conjunctiva] : normal sclera/conjunctiva [PERRL] : pupils equal round and reactive to light [EOMI] : extraocular movements intact [No Respiratory Distress] : no respiratory distress  [No Accessory Muscle Use] : no accessory muscle use [Clear to Auscultation] : lungs were clear to auscultation bilaterally [Normal Rate] : normal rate  [Regular Rhythm] : with a regular rhythm [Normal S1, S2] : normal S1 and S2 [de-identified] : swelling of b/l neck at area of SCMs

## 2024-04-18 NOTE — HISTORY OF PRESENT ILLNESS
[FreeTextEntry8] : 54M presents for b/l neck swelling for 2 weeks. Reports possible scratchy throat but denies ear pain, dyspnea, cough, fever, chills, night sweats.

## 2024-04-22 ENCOUNTER — APPOINTMENT (OUTPATIENT)
Dept: ULTRASOUND IMAGING | Facility: CLINIC | Age: 54
End: 2024-04-22
Payer: COMMERCIAL

## 2024-04-22 PROCEDURE — 76536 US EXAM OF HEAD AND NECK: CPT

## 2024-11-12 ENCOUNTER — APPOINTMENT (OUTPATIENT)
Dept: CT IMAGING | Facility: CLINIC | Age: 54
End: 2024-11-12
Payer: COMMERCIAL

## 2024-11-12 PROCEDURE — 75571 CT HRT W/O DYE W/CA TEST: CPT

## 2024-11-21 DIAGNOSIS — R59.9 ENLARGED LYMPH NODES, UNSPECIFIED: ICD-10-CM

## 2025-03-06 NOTE — H&P PST ADULT - NSANTHOBSERVEDRD_ENT_A_CORE
H&P reviewed. The patient was examined and there are no changes to the H&P. Pt wishes to focus on L side of neck. Left C5-6, C6-7 MBNB #1 w/ local anesthetic only under fluoro today to assess candidacy for RFA. Patient's pain stable and persistent from last visit.  No personal/family hx issues with anesthesia. Denies allergies to Latex, steroids, local anesthetics, or iodine/contrast. Denies being on blood thinners. Not diabetic.  Denies fever, chills, NS, CP, SOB, cough, N/V.    Discussed procedure risks/benefits in detail with patient. Pt meets medical necessity for procedure due to failure of conservative measures. Reviewed procedural risks including bleeding, infection, nerve damage, paralysis. Also reviewed mitigating factors such as screening for infection/blood thinner use, sterile precautions, and image-guidance when applicable. All questions answered. Pt/guardian expressed understanding and choose to proceed      Sofiya Mandujano MD  Anesthesiologist & Interventional Pain Physician   [facility] [place]  O: [phone]  F: [phone]  7:52 AM  03/06/25     No

## 2025-03-11 ENCOUNTER — APPOINTMENT (OUTPATIENT)
Dept: CT IMAGING | Facility: CLINIC | Age: 55
End: 2025-03-11

## 2025-03-11 PROCEDURE — 71250 CT THORAX DX C-: CPT

## 2025-05-22 NOTE — H&P PST ADULT - DIAGNOSTICS LABS REVIEWED
Patient states she needs to call us back to reschedule her appointment with Dr. Lafleur.  
Not applicable

## 2025-07-28 ENCOUNTER — NON-APPOINTMENT (OUTPATIENT)
Age: 55
End: 2025-07-28

## 2025-07-29 ENCOUNTER — APPOINTMENT (OUTPATIENT)
Dept: INTERNAL MEDICINE | Facility: CLINIC | Age: 55
End: 2025-07-29
Payer: COMMERCIAL

## 2025-07-29 ENCOUNTER — LABORATORY RESULT (OUTPATIENT)
Age: 55
End: 2025-07-29

## 2025-07-29 VITALS
WEIGHT: 271 LBS | OXYGEN SATURATION: 96 % | BODY MASS INDEX: 34.78 KG/M2 | SYSTOLIC BLOOD PRESSURE: 132 MMHG | TEMPERATURE: 98.4 F | DIASTOLIC BLOOD PRESSURE: 84 MMHG | HEART RATE: 79 BPM | HEIGHT: 74 IN | RESPIRATION RATE: 16 BRPM

## 2025-07-29 DIAGNOSIS — R59.9 ENLARGED LYMPH NODES, UNSPECIFIED: ICD-10-CM

## 2025-07-29 DIAGNOSIS — Z87.898 PERSONAL HISTORY OF OTHER SPECIFIED CONDITIONS: ICD-10-CM

## 2025-07-29 DIAGNOSIS — Z00.00 ENCOUNTER FOR GENERAL ADULT MEDICAL EXAMINATION W/OUT ABNORMAL FINDINGS: ICD-10-CM

## 2025-07-29 DIAGNOSIS — I10 ESSENTIAL (PRIMARY) HYPERTENSION: ICD-10-CM

## 2025-07-29 PROCEDURE — 99396 PREV VISIT EST AGE 40-64: CPT

## 2025-07-30 ENCOUNTER — TRANSCRIPTION ENCOUNTER (OUTPATIENT)
Age: 55
End: 2025-07-30

## 2025-07-30 LAB
ALBUMIN SERPL ELPH-MCNC: 5.1 G/DL
ALP BLD-CCNC: 80 U/L
ALT SERPL-CCNC: 28 U/L
ANION GAP SERPL CALC-SCNC: 16 MMOL/L
AST SERPL-CCNC: 29 U/L
BILIRUB SERPL-MCNC: 0.5 MG/DL
BUN SERPL-MCNC: 18 MG/DL
CALCIUM SERPL-MCNC: 10 MG/DL
CHLORIDE SERPL-SCNC: 102 MMOL/L
CHOLEST SERPL-MCNC: 207 MG/DL
CO2 SERPL-SCNC: 21 MMOL/L
CREAT SERPL-MCNC: 0.79 MG/DL
EGFRCR SERPLBLD CKD-EPI 2021: 104 ML/MIN/1.73M2
ESTIMATED AVERAGE GLUCOSE: 117 MG/DL
GLUCOSE SERPL-MCNC: 89 MG/DL
HBA1C MFR BLD HPLC: 5.7 %
HCT VFR BLD CALC: 46 %
HDLC SERPL-MCNC: 55 MG/DL
HGB BLD-MCNC: 15 G/DL
LDLC SERPL-MCNC: 133 MG/DL
MCHC RBC-ENTMCNC: 28.9 PG
MCHC RBC-ENTMCNC: 32.6 G/DL
MCV RBC AUTO: 88.6 FL
NONHDLC SERPL-MCNC: 152 MG/DL
PLATELET # BLD AUTO: 187 K/UL
POTASSIUM SERPL-SCNC: 4.6 MMOL/L
PROT SERPL-MCNC: 7.6 G/DL
PSA SERPL-MCNC: 1.23 NG/ML
RBC # BLD: 5.19 M/UL
RBC # FLD: 13.4 %
SODIUM SERPL-SCNC: 139 MMOL/L
TRIGL SERPL-MCNC: 104 MG/DL
TSH SERPL-ACNC: 1.66 UIU/ML
WBC # FLD AUTO: 5.22 K/UL

## 2025-09-08 ENCOUNTER — APPOINTMENT (OUTPATIENT)
Dept: CT IMAGING | Facility: CLINIC | Age: 55
End: 2025-09-08
Payer: COMMERCIAL

## 2025-09-08 PROCEDURE — 71250 CT THORAX DX C-: CPT

## 2025-09-18 ENCOUNTER — TRANSCRIPTION ENCOUNTER (OUTPATIENT)
Age: 55
End: 2025-09-18

## (undated) DEVICE — SUT SURGIPRO 2-0 30" V-20

## (undated) DEVICE — GLV 8.5 DURAPRENE

## (undated) DEVICE — SPECIMEN CONTAINER PET

## (undated) DEVICE — GOWN XL

## (undated) DEVICE — SOL IRR POUR NS 0.9% 500ML

## (undated) DEVICE — SOL IRR POUR H2O 1500ML

## (undated) DEVICE — LIGASURE SMALL JAW

## (undated) DEVICE — SUT POLYSORB 2 30" GS-26

## (undated) DEVICE — DRSG STERISTRIPS 0.5 X 4"

## (undated) DEVICE — SUT POLYSORB 4-0 30" C-13 UNDYED

## (undated) DEVICE — DRAPE LIGHT HANDLE COVER (GREEN)

## (undated) DEVICE — VENODYNE/SCD SLEEVE CALF LARGE

## (undated) DEVICE — LAP PAD 18 X 18"

## (undated) DEVICE — POSITIONER STRAP ARMBOARD VELCRO TS-30

## (undated) DEVICE — POSITIONER FOAM HEAD CRADLE (PINK)

## (undated) DEVICE — SUT CHROMIC 2-0 30" V-20

## (undated) DEVICE — PREP CHLORAPREP HI-LITE ORANGE 26ML

## (undated) DEVICE — SUT SURGIPRO 0 30" GS-22

## (undated) DEVICE — SOLIDIFIER CANN EXPRESS 3K

## (undated) DEVICE — ELCTR BOVIE TIP NEEDLE INSULATED 2.8" EDGE

## (undated) DEVICE — SUT POLYSORB 3-0 30" V-20 UNDYED

## (undated) DEVICE — DRSG CURITY GAUZE SPONGE 4 X 4" 12-PLY

## (undated) DEVICE — GLV 8.5 PROTEXIS (WHITE)

## (undated) DEVICE — SUT POLYSORB 2-0 30" V-20 UNDYED

## (undated) DEVICE — STAPLER SKIN VISI-STAT 35 WIDE

## (undated) DEVICE — PACK MINOR

## (undated) DEVICE — SPONGE PEANUT AUTO COUNT

## (undated) DEVICE — SUT CHROMIC 2-0 60" REEL

## (undated) DEVICE — SUT SURGIPRO II 2-0 30" GS-21

## (undated) DEVICE — ELCTR BOVIE PENCIL SMOKE EVACUATION

## (undated) DEVICE — CANISTER SUCTION LID GUARD 3000CC

## (undated) DEVICE — GLV COTTON DEROYAL XL

## (undated) DEVICE — WARMING BLANKET UPPER ADULT

## (undated) DEVICE — DRAPE INSTRUMENT POUCH 6.75" X 11"

## (undated) DEVICE — SUT SURGIPRO 1 30" GS-21